# Patient Record
Sex: FEMALE | Race: WHITE | NOT HISPANIC OR LATINO | Employment: FULL TIME | URBAN - METROPOLITAN AREA
[De-identification: names, ages, dates, MRNs, and addresses within clinical notes are randomized per-mention and may not be internally consistent; named-entity substitution may affect disease eponyms.]

---

## 2019-08-15 ENCOUNTER — TELEPHONE (OUTPATIENT)
Dept: NEUROLOGY | Facility: CLINIC | Age: 29
End: 2019-08-15

## 2021-04-08 DIAGNOSIS — Z23 ENCOUNTER FOR IMMUNIZATION: ICD-10-CM

## 2021-04-21 ENCOUNTER — OFFICE VISIT (OUTPATIENT)
Dept: FAMILY MEDICINE CLINIC | Facility: CLINIC | Age: 31
End: 2021-04-21
Payer: COMMERCIAL

## 2021-04-21 VITALS
HEIGHT: 63 IN | SYSTOLIC BLOOD PRESSURE: 124 MMHG | HEART RATE: 74 BPM | TEMPERATURE: 98.9 F | DIASTOLIC BLOOD PRESSURE: 74 MMHG | BODY MASS INDEX: 42.97 KG/M2 | OXYGEN SATURATION: 97 % | WEIGHT: 242.5 LBS

## 2021-04-21 DIAGNOSIS — Z00.00 WELL ADULT EXAM: Primary | ICD-10-CM
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
## 2021-04-21 DIAGNOSIS — N91.2 AMENORRHEA: ICD-10-CM

## 2021-04-21 DIAGNOSIS — G40.409 OTHER GENERALIZED EPILEPSY, NOT INTRACTABLE, WITHOUT STATUS EPILEPTICUS (HCC): ICD-10-CM

## 2021-04-21 PROBLEM — G40.909 EPILEPSY (HCC): Status: ACTIVE | Noted: 2021-04-21

## 2021-04-21 PROBLEM — F31.81 BIPOLAR 2 DISORDER (HCC): Status: ACTIVE | Noted: 2021-04-21

## 2021-04-21 PROCEDURE — 3725F SCREEN DEPRESSION PERFORMED: CPT | Performed by: FAMILY MEDICINE

## 2021-04-21 PROCEDURE — 99385 PREV VISIT NEW AGE 18-39: CPT | Performed by: FAMILY MEDICINE

## 2021-04-21 PROCEDURE — 1036F TOBACCO NON-USER: CPT | Performed by: FAMILY MEDICINE

## 2021-04-21 PROCEDURE — 3008F BODY MASS INDEX DOCD: CPT | Performed by: FAMILY MEDICINE

## 2021-04-21 RX ORDER — LAMOTRIGINE 300 MG/1
TABLET, EXTENDED RELEASE ORAL
COMMUNITY
Start: 2012-02-27 | End: 2021-04-21

## 2021-04-21 RX ORDER — MULTIVITAMIN/IRON/FOLIC ACID 18MG-0.4MG
TABLET ORAL
COMMUNITY
Start: 2019-05-01

## 2021-04-21 RX ORDER — LAMOTRIGINE 300 MG/1
1 TABLET, EXTENDED RELEASE ORAL DAILY
COMMUNITY
Start: 2021-03-17 | End: 2022-03-04 | Stop reason: SDUPTHER

## 2021-04-21 NOTE — LETTER
April 21, 2021     Patient: Adrian Lundy   YOB: 1990   Date of Visit: 4/21/2021       To Whom it May Concern:    Adrian Lundy was seen in my clinic on 4/21/2021  She may return to work on 4/23/2021  If you have any questions or concerns, please don't hesitate to call           Sincerely,          Mike Valdivia DO        CC: No Recipients

## 2021-04-21 NOTE — PATIENT INSTRUCTIONS
NEUROLOGY EVALUATION FOR BREAK THROUGH SEIZURES   SCHEDULE WITH OB/GYN FOR PAP AND FOR LACK OF MENSTRUAL PERIODS  FASTING BLOOD WORK- [facility] OR Wednesday OR Thursday SCHEDULE HERE

## 2021-04-21 NOTE — PROGRESS NOTES
Zulema Mcneill is a 35 y o   female and is here for routine health maintenance  The patient reports no menses for many years, had hormones checked and were normal       History of Present Illness     Pt is here for her initial visit in our office  PT HAD SEIZURE YESTERDAY  Pt has epilepsy  She does not remember anything but her niece was with her at the time  She is not sure what she hit the right side of her face on  Her RIGHT EYE has BRUISING, LEFT CHEEK BONE AND UPPER LIP with swelling, LEFT FOOT pain, INTERMITTENT HEADACHE, NO DIZZINESS, NO NAUSEA  Family history MOM WITH EPILEPSY, DIAGNOSED AT 17YO  PSYCHIATRIST= DR Alex Turk- prescribes lamictal  Does not see a neurologist    Diane Juanjose, ARMS AND LEGS TIGHTEN AND SHAKE   Her seizures usually last 30 [address] prior to full seizure but she has no warnings  They try to LAY her DOWN IN SAFE PLACE  After the seizure, she generally SLEEPS ALL DAY  Pt states she has not had a menstrual cycle for many years  Had an evaluation and had hormones to induce her menstrual cycle and did not start cycle  Pt has been  for 1 year and is considering having children  Would recommend getting further evaluation with ob/gyn          Well Adult Physical   Patient here for a comprehensive physical exam       Diet and Physical Activity  Diet: well balanced diet  Weight concerns: Patient has class 3 obesity (BMI >40)  Exercise: frequently      Depression Screen  PHQ-9 Depression Screening    PHQ-9:   Frequency of the following problems over the past two weeks:      Little interest or pleasure in doing things: 0 - not at all  Feeling down, depressed, or hopeless: 0 - not at all  PHQ-2 Score: 0          General Health  Hearing: Normal:  bilateral  Vision: no vision problems  Dental: regular dental visits     History:  LMP: No LMP recorded        Cancer Screening  Colononoscopy not indicated  Mammogram not indicated  Pap needs to schedule  Abnormal pap? no  Smoker NO Annual screening with low-dose helical computed tomography (CT) for patients age 54 to 76 years with history of smoking at least 30 pack-years and, if a former smoker, had quit within the previous 15 years      The following portions of the patient's history were reviewed and updated as appropriate: allergies, current medications, past family history, past medical history, past social history, past surgical history and problem list     Review of Systems     Review of Systems   Constitutional: Negative  Negative for fatigue and fever  HENT: Negative  Eyes: Negative  Respiratory: Negative  Negative for cough  Cardiovascular: Negative  Gastrointestinal: Negative  Endocrine: Negative  Genitourinary: Positive for menstrual problem  Negative for pelvic pain  Musculoskeletal: Negative  Skin: Negative  Allergic/Immunologic: Negative  Neurological:        Seizures   Psychiatric/Behavioral: Negative  Past Medical History     History reviewed  No pertinent past medical history  Past Surgical History     History reviewed  No pertinent surgical history      Social History     Social History     Socioeconomic History    Marital status: /Civil Union     Spouse name: None    Number of children: None    Years of education: None    Highest education level: None   Occupational History    None   Social Needs    Financial resource strain: None    Food insecurity     Worry: None     Inability: None    Transportation needs     Medical: None     Non-medical: None   Tobacco Use    Smoking status: Never Smoker    Smokeless tobacco: Never Used   Substance and Sexual Activity    Alcohol use: Never     Frequency: Never    Drug use: Never    Sexual activity: None   Lifestyle    Physical activity     Days per week: None     Minutes per session: None    Stress: None   Relationships    Social connections     Talks on phone: None     Gets together: None     Attends Mosque service: None     Active member of club or organization: None     Attends meetings of clubs or organizations: None     Relationship status: None    Intimate partner violence     Fear of current or ex partner: None     Emotionally abused: None     Physically abused: None     Forced sexual activity: None   Other Topics Concern    None   Social History Narrative    None       Family History     History reviewed  No pertinent family history  Current Medications       Current Outpatient Medications:     lamoTRIgine  MG TB24, Take 1 tablet by mouth daily, Disp: , Rfl:     Multiple Vitamins-Minerals (Spectravite) TABS, , Disp: , Rfl:      Allergies     Allergies   Allergen Reactions    Ibuprofen & Acetaminophen Dizziness, Headache and Vomiting    Monosodium Glutamate - Food Allergy Dizziness, Headache and Vomiting    Penicillins Dizziness, Headache and Vomiting       Objective     /74   Pulse 74   Temp 98 9 °F (37 2 °C)   Ht 5' 2 5" (1 588 m)   Wt 110 kg (242 lb 8 oz)   SpO2 97%   BMI 43 65 kg/m²      Physical Exam  Vitals signs and nursing note reviewed  Constitutional:       Appearance: She is well-developed  HENT:      Head: Normocephalic and atraumatic  Cardiovascular:      Rate and Rhythm: Normal rate and regular rhythm  Heart sounds: Normal heart sounds  Pulmonary:      Effort: Pulmonary effort is normal       Breath sounds: Normal breath sounds  Abdominal:      General: Bowel sounds are normal       Palpations: Abdomen is soft  Skin:     General: Skin is warm and dry  Findings: Bruising present  Comments: ecchymosis over right eye  No obvious deformity   Neurological:      Mental Status: She is alert and oriented to person, place, and time  Psychiatric:         Behavior: Behavior normal          Thought Content: Thought content normal          Judgment: Judgment normal          BMI Counseling:  Body mass index is 43 65 kg/m²  The BMI is above normal  Nutrition recommendations include reducing portion sizes  Exercise recommendations include exercising 3-5 times per week  No exam data present    Health Maintenance     Health Maintenance   Topic Date Due    HIV Screening  Never done    COVID-19 Vaccine (1) Never done    BMI: Followup Plan  Never done    Annual Physical  Never done    Cervical Cancer Screening  Never done    BMI: Adult  04/21/2022    DTaP,Tdap,and Td Vaccines (7 - Td) 08/01/2024    HIB Vaccine  Completed    Hepatitis B Vaccine  Completed    Influenza Vaccine  Completed    HPV Vaccine  Completed    Pneumococcal Vaccine: Pediatrics (0 to 5 Years) and At-Risk Patients (6 to 59 Years)  Aged Out    IPV Vaccine  Aged Out    Hepatitis A Vaccine  Aged Out    Meningococcal ACWY Vaccine  Aged [employer] History   Administered Date(s) Administered    DTaP 04/21/1992, 07/28/1993, 03/07/1995, 05/16/1995    HPV Quadrivalent 05/07/2009, 06/11/2010, 09/20/2011    Hep B, Adolescent or Pediatric 05/12/1999, 06/16/1999, 11/26/1999    HiB 04/21/1992, 03/07/1995, 05/16/1995    INFLUENZA 08/22/2016, 02/26/2021    IPV 04/21/1992, 03/07/1995, 05/16/1995    MMR 03/07/1995, 04/12/2000    Td (adult), adsorbed 05/17/2005    Tdap 05/07/2009, 08/01/2014    Varicella 06/16/1999, 05/07/2009       Assessment/Plan         1  Healthy female exam   2  Patient Counseling:   · Nutrition: Stressed importance of a well balanced diet, moderation of sodium/saturated fat, caloric balance and sufficient intake of fiber  · Exercise: Stressed the importance of regular exercise with a goal of 150 minutes per week  · Dental Health: Discussed daily flossing and brushing and regular dental visits     · Immunizations reviewed  · Discussed benefits of screening   · Discussed the patient's BMI with her  The BMI is above average; BMI management plan is completed  3  Cancer Screening   4  Labs   5  Neurology evaluation  6  Follow up in one year      Elba Preston DO

## 2021-04-25 PROBLEM — N91.2 AMENORRHEA: Status: ACTIVE | Noted: 2021-04-25

## 2021-04-26 DIAGNOSIS — G40.909 NONINTRACTABLE EPILEPSY WITHOUT STATUS EPILEPTICUS, UNSPECIFIED EPILEPSY TYPE (HCC): Primary | ICD-10-CM

## 2021-04-26 PROBLEM — Z00.00 WELL ADULT EXAM: Status: ACTIVE | Noted: 2021-04-26

## 2021-04-27 ENCOUNTER — TELEPHONE (OUTPATIENT)
Dept: NEUROLOGY | Facility: CLINIC | Age: 31
End: 2021-04-27

## 2021-04-27 NOTE — TELEPHONE ENCOUNTER
Best contact number for patient: [phone]     Emergency Contact name and number: [phone] Bar [place]     Referring provider and telephone number: Bea Martinez     Primary Care Provider Name and if affiliated with [facility]:  Bea Single     Reason for Appointment/Dx: epilepsy     Have you seen and followed up with a pediatric Neurologist for this disease in the past?  No     No (If yes ok to schedule with Dr Nick Rolle)    Neurology Location patient would like to be seen:    Order received? Yes                                                 Records Received? Yes    Have you ever seen another Neurologist?       No    Insurance Information    Insurance Name:    ID/Policy #:    Secondary Insurance:    ID/Policy#: Workman's Comp/ Accident/ School  Information      Workman's Comp/Accident/School related?        No    If yes name of Insurance company:    Claim #:    Date of Injury:    Type of Injury:     Name and Telephone Number:    Notes:                   Appointment date: 07/15/2021

## 2021-08-18 ENCOUNTER — OFFICE VISIT (OUTPATIENT)
Dept: FAMILY MEDICINE CLINIC | Facility: CLINIC | Age: 31
End: 2021-08-18
Payer: COMMERCIAL

## 2021-08-18 VITALS
TEMPERATURE: 97.9 F | WEIGHT: 246.75 LBS | HEART RATE: 85 BPM | BODY MASS INDEX: 43.72 KG/M2 | DIASTOLIC BLOOD PRESSURE: 80 MMHG | OXYGEN SATURATION: 98 % | HEIGHT: 63 IN | SYSTOLIC BLOOD PRESSURE: 118 MMHG

## 2021-08-18 DIAGNOSIS — Z13.220 SCREENING FOR LIPID DISORDERS: ICD-10-CM

## 2021-08-18 DIAGNOSIS — M62.81 HAND MUSCLE WEAKNESS: ICD-10-CM

## 2021-08-18 DIAGNOSIS — J45.20 MILD INTERMITTENT ASTHMA WITHOUT COMPLICATION: Primary | ICD-10-CM

## 2021-08-18 DIAGNOSIS — Z13.1 SCREENING FOR DIABETES MELLITUS: ICD-10-CM

## 2021-08-18 DIAGNOSIS — Z13.29 SCREENING FOR THYROID DISORDER: ICD-10-CM

## 2021-08-18 DIAGNOSIS — Z13.0 SCREENING, ANEMIA, DEFICIENCY, IRON: ICD-10-CM
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
## 2021-08-18 PROBLEM — Z00.00 WELL ADULT EXAM: Status: RESOLVED | Noted: 2021-04-26 | Resolved: 2021-08-18

## 2021-08-18 PROCEDURE — 99213 OFFICE O/P EST LOW 20 MIN: CPT | Performed by: FAMILY MEDICINE

## 2021-08-18 PROCEDURE — 3008F BODY MASS INDEX DOCD: CPT | Performed by: FAMILY MEDICINE

## 2021-08-18 PROCEDURE — 1036F TOBACCO NON-USER: CPT | Performed by: FAMILY MEDICINE

## 2021-08-18 RX ORDER — ALBUTEROL SULFATE 90 UG/1
2 AEROSOL, METERED RESPIRATORY (INHALATION) EVERY 6 HOURS PRN
Qty: 18 G | Refills: 1 | Status: SHIPPED | OUTPATIENT
Start: 2021-08-18 | End: 2021-09-17

## 2021-08-18 NOTE — PROGRESS NOTES
Subjective:   Chief Complaint   Patient presents with    Asthma     pt c/o trouble breathing, chest tightness last weekend, pt states she need a refill on her inhailer, pt advised to schedule for pap    Hand Pain     pt c/o dull pain in her left hand when she applies pressure that started 2 months ago, pt states she noticed about 2 months ago that she could not bend her fingers on her left hand then shortly after that she noticed the pain in the hand        Patient ID: Lauren Renteria is a 28 y o  female  The patient is here today because she has problem with her left 5th finger  This has been going on for almost a year   The finger does not "work"  She cannot bring it in to touch her 4th finger   she denies any known trauma  It seemed to get a little bit better over the winter but has gotten worse again this summer  She has not done anything for, seen anyone for it     Additionally, last week her asthma flare  Was very humid and hot   She was feeling wheezy with some shortness of breath   She did use the last of her inhaler  I did work but she now needs a refill              Shortness of Breath  This is a recurrent problem  The current episode started in the past 7 days  The problem occurs rarely  The problem has been waxing and waning  Pertinent negatives include no abdominal pain, chest pain, claudication, coryza, ear pain, fever, headaches, hemoptysis, leg pain, leg swelling, neck pain, orthopnea, PND, rash, rhinorrhea, sore throat, sputum production, swollen glands, syncope, vomiting or wheezing  The symptoms are aggravated by emotional upset, exercise and fumes  The following portions of the patient's history were reviewed and updated as appropriate: allergies, current medications, past family history, past medical history, past social history, past surgical history and problem list     Review of Systems   Constitutional: Negative for fever     HENT: Negative for ear pain, rhinorrhea and sore throat  Respiratory: Positive for shortness of breath  Negative for hemoptysis, sputum production and wheezing  Cardiovascular: Negative for chest pain, orthopnea, claudication, leg swelling, syncope and PND  Gastrointestinal: Negative for abdominal pain and vomiting  Musculoskeletal: Negative for neck pain  Skin: Negative for rash  Neurological: Negative for headaches  Objective:  Vitals:    08/18/21 1249   BP: 118/80   Pulse: 85   Temp: 97 9 °F (36 6 °C)   SpO2: 98%   Weight: 112 kg (246 lb 12 oz)   Height: 5' 2 5" (1 588 m)      Physical Exam  Constitutional:       General: She is not in acute distress  Appearance: Normal appearance  She is not ill-appearing  Pulmonary:      Effort: Pulmonary effort is normal       Breath sounds: Normal breath sounds  No wheezing  Musculoskeletal:      Comments: Left 5th finger with 2/5 strength, no obvious abnormality, no pain   Skin:     General: Skin is warm and dry  Findings: No erythema or rash  Neurological:      General: No focal deficit present  Mental Status: She is alert and oriented to person, place, and time  Cranial Nerves: No cranial nerve deficit  Gait: Gait normal    Psychiatric:         Mood and Affect: Mood normal          Behavior: Behavior normal          Thought Content: Thought content normal          Judgment: Judgment normal            Assessment/Plan:    No problem-specific Assessment & Plan notes found for this encounter  Diagnoses and all orders for this visit:    Mild intermittent asthma without complication  -     albuterol (PROVENTIL HFA,VENTOLIN HFA) 90 mcg/act inhaler; Inhale 2 puffs every 6 (six) hours as needed for wheezing    Hand muscle weakness  -     Ambulatory referral to Orthopedic Surgery; Future    Screening, anemia, deficiency, iron  -     CBC and differential; Future  -     CBC and differential    Screening for diabetes mellitus  -     Comprehensive metabolic panel;  Future  - Comprehensive metabolic panel    Screening for lipid disorders  -     Lipid Panel with Direct LDL reflex    Screening for thyroid disorder  -     TSH, 3rd generation with Free T4 reflex;  Future  -     TSH, 3rd generation with Free T4 reflex

## 2021-09-29 ENCOUNTER — TELEPHONE (OUTPATIENT)
Dept: OBGYN CLINIC | Facility: MEDICAL CENTER | Age: 31
End: 2021-09-29

## 2021-09-29 NOTE — TELEPHONE ENCOUNTER
I called patient to see if she wanted to schedule an appt since she put in an appt request through the [facility] website, but no answer  I did leave a VM to give us a call back to schedule appt if still interested

## 2021-10-05 NOTE — TELEPHONE ENCOUNTER
Left 2nd msg for PT offer ortho Appt post email request  Provider calls were also left in August 2021 offering to schedule

## 2021-10-12 ENCOUNTER — OFFICE VISIT (OUTPATIENT)
Dept: OBGYN CLINIC | Facility: CLINIC | Age: 31
End: 2021-10-12
Payer: COMMERCIAL

## 2021-10-12 ENCOUNTER — APPOINTMENT (OUTPATIENT)
Dept: RADIOLOGY | Facility: CLINIC | Age: 31
End: 2021-10-12
Payer: COMMERCIAL

## 2021-10-12 VITALS
HEIGHT: 63 IN | SYSTOLIC BLOOD PRESSURE: 122 MMHG | WEIGHT: 247 LBS | BODY MASS INDEX: 43.77 KG/M2 | DIASTOLIC BLOOD PRESSURE: 82 MMHG

## 2021-10-12 DIAGNOSIS — M62.81 HAND MUSCLE WEAKNESS: ICD-10-CM

## 2021-10-12 DIAGNOSIS — M79.642 HAND PAIN, LEFT: ICD-10-CM

## 2021-10-12 DIAGNOSIS — R29.898 WEAKNESS OF LEFT HAND: Primary | ICD-10-CM

## 2021-10-12 PROCEDURE — 3008F BODY MASS INDEX DOCD: CPT | Performed by: ORTHOPAEDIC SURGERY

## 2021-10-12 PROCEDURE — 73130 X-RAY EXAM OF HAND: CPT

## 2021-10-12 PROCEDURE — 1036F TOBACCO NON-USER: CPT | Performed by: ORTHOPAEDIC SURGERY

## 2021-10-12 PROCEDURE — 99244 OFF/OP CNSLTJ NEW/EST MOD 40: CPT | Performed by: ORTHOPAEDIC SURGERY

## 2021-12-08 ENCOUNTER — HOSPITAL ENCOUNTER (OUTPATIENT)
Dept: NEUROLOGY | Facility: CLINIC | Age: 31
Discharge: HOME | End: 2021-12-08
Payer: COMMERCIAL

## 2021-12-08 DIAGNOSIS — R29.898 WEAKNESS OF LEFT HAND: ICD-10-CM

## 2021-12-08 PROCEDURE — 95910 NRV CNDJ TEST 7-8 STUDIES: CPT

## 2021-12-08 PROCEDURE — 95910 NRV CNDJ TEST 7-8 STUDIES: CPT | Performed by: PSYCHIATRY & NEUROLOGY

## 2021-12-08 PROCEDURE — 95886 MUSC TEST DONE W/N TEST COMP: CPT | Performed by: PSYCHIATRY & NEUROLOGY

## 2021-12-08 PROCEDURE — 95886 MUSC TEST DONE W/N TEST COMP: CPT

## 2022-02-17 ENCOUNTER — HOSPITAL ENCOUNTER (EMERGENCY)
Facility: HOSPITAL | Age: 32
Discharge: HOME | End: 2022-02-17
Attending: EMERGENCY MEDICINE | Admitting: EMERGENCY MEDICINE
Payer: COMMERCIAL

## 2022-02-17 ENCOUNTER — APPOINTMENT (EMERGENCY)
Dept: RADIOLOGY | Facility: HOSPITAL | Age: 32
End: 2022-02-17
Payer: COMMERCIAL

## 2022-02-17 VITALS
SYSTOLIC BLOOD PRESSURE: 138 MMHG | DIASTOLIC BLOOD PRESSURE: 82 MMHG | TEMPERATURE: 97.8 F | BODY MASS INDEX: 45.45 KG/M2 | HEIGHT: 62 IN | WEIGHT: 247 LBS | HEART RATE: 86 BPM | RESPIRATION RATE: 20 BRPM | OXYGEN SATURATION: 99 %

## 2022-02-17 DIAGNOSIS — M54.50 LUMBAR BACK PAIN: ICD-10-CM

## 2022-02-17 DIAGNOSIS — G40.909 RECURRENT SEIZURES (HCC): Primary | ICD-10-CM

## 2022-02-17 PROCEDURE — 99285 EMERGENCY DEPT VISIT HI MDM: CPT | Performed by: EMERGENCY MEDICINE

## 2022-02-17 PROCEDURE — 99283 EMERGENCY DEPT VISIT LOW MDM: CPT

## 2022-02-17 PROCEDURE — 72100 X-RAY EXAM L-S SPINE 2/3 VWS: CPT

## 2022-02-17 RX ORDER — LIDOCAINE 50 MG/G
1 PATCH TOPICAL ONCE
Status: DISCONTINUED | OUTPATIENT
Start: 2022-02-17 | End: 2022-02-17 | Stop reason: HOSPADM

## 2022-02-17 RX ADMIN — LIDOCAINE 5% 1 PATCH: 700 PATCH TOPICAL at 18:56

## 2022-02-17 NOTE — DISCHARGE INSTRUCTIONS
Take over-the-counter Motrin (3 tablets with food) every 8 hours as needed for pain and inflammation  You should also purchase over-the-counter lidocaine pain patches  Ask the pharmacist for help purchasing an appropriate product

## 2022-02-17 NOTE — Clinical Note
Katey Wooten was seen and treated in our emergency department on 2/17/2022  No work until cleared by Family Doctor/Orthopedics        Diagnosis:     Yael Abdi    She may return on this date: If you have any questions or concerns, please don't hesitate to call        Hannah Youssef PA-C    ______________________________           _______________          _______________  Hospital Representative                              Date                                Time

## 2022-02-17 NOTE — ED PROVIDER NOTES
History  Chief Complaint   Patient presents with    Back Pain     Pts  states that pt fell in bathroom earlier and then when he assisted her to the bed she had a seizure for 90 seconds, he states pt was having tense arms, shaking movements and that she was drooling  Pt denies any missed doses  71-year-old female presents for evaluation of low back pain sustained after a fall during a seizure where she was witnessed to have struck her low back against the edge of the toilet  The patient has a history of epilepsy Lamictal   She is compliant with her medications however she was woken early this morning due to wild family members  The patient had a witnessed seizure without head strike this morning similar to previous seizures  The patient returned to her baseline however she is complaining of pain in the lumbar region  She denies any associated numbness or tingling in her lower extremities, genitals or rectum  The patient has normal control of bowel or bladder at this time  The patient denies headache focal weakness  The patient did not take anything for pain prior to arrival   Her pain is worse with movement  Back Pain      Prior to Admission Medications   Prescriptions Last Dose Informant Patient Reported? Taking? Multiple Vitamins-Minerals (Spectravite) TABS   Yes No   lamoTRIgine  MG TB24   Yes No   Sig: Take 1 tablet by mouth daily      Facility-Administered Medications: None       Past Medical History:   Diagnosis Date    Bipolar 2 disorder ([facility] )     Epilepsy ([facility] )        Past Surgical History:   Procedure Laterality Date    WISDOM TOOTH EXTRACTION         Family History   Problem Relation Age of Onset    Bipolar disorder Mother     Seizures Mother     Depression Mother      I have reviewed and agree with the history as documented      E-Cigarette/Vaping    E-Cigarette Use Never User      E-Cigarette/Vaping Substances     Social History     Tobacco Use    Smoking status: Never Smoker    Smokeless tobacco: Never Used   Vaping Use    Vaping Use: Never used   Substance Use Topics    Alcohol use: Never    Drug use: Never       Review of Systems   Musculoskeletal: Positive for back pain  Neurological: Positive for seizures  All other systems reviewed and are negative  Physical Exam  Physical Exam  Vitals and nursing note reviewed  Constitutional:       General: She is not in acute distress  Appearance: She is well-developed  She is obese  HENT:      Head: Normocephalic and atraumatic  Right Ear: External ear normal       Left Ear: External ear normal    Eyes:      General: No scleral icterus  Conjunctiva/sclera: Conjunctivae normal       Pupils: Pupils are equal, round, and reactive to light  Cardiovascular:      Rate and Rhythm: Normal rate and regular rhythm  Heart sounds: Normal heart sounds  Pulmonary:      Effort: Pulmonary effort is normal  No respiratory distress  Breath sounds: Normal breath sounds  Abdominal:      General: Bowel sounds are normal       Palpations: Abdomen is soft  Tenderness: There is no abdominal tenderness  There is no guarding or rebound  Musculoskeletal:         General: Normal range of motion  Cervical back: Normal range of motion  Lumbar back: Tenderness and bony tenderness present  No deformity  Skin:     General: Skin is warm and dry  Findings: No rash  Neurological:      Mental Status: She is alert and oriented to person, place, and time           Vital Signs  ED Triage Vitals [02/17/22 1350]   Temperature Pulse Respirations Blood Pressure SpO2   97 8 °F (36 6 °C) 88 16 147/78 99 %      Temp Source Heart Rate Source Patient Position - Orthostatic VS BP Location FiO2 (%)   Temporal Monitor Sitting Right arm --      Pain Score       10 - Worst Possible Pain           Vitals:    02/17/22 1350 02/17/22 1830   BP: 147/78 138/82   Pulse: 88 86   Patient Position - Orthostatic VS: Sitting Visual Acuity      ED Medications  Medications   lidocaine (LIDODERM) 5 % patch 1 patch (has no administration in time range)       Diagnostic Studies  Results Reviewed     Procedure Component Value Units Date/Time    POCT urinalysis dipstick [[id number]]     Lab Status: No result Specimen: Urine                  XR lumbar spine 2 or 3 views   ED Interpretation by Vangie Gu DO (02/17 1846)   No acute osseous abnormality  Loss of lumbar lordosis                 Procedures  Procedures         ED Course                                             MDM  Number of Diagnoses or Management Options  Lumbar back pain  Recurrent seizures ([facility] )  Diagnosis management comments: The patient returned to her baseline from her seizure  Patient states that she has seizures several times a year under most recent seizure was 3 weeks ago  Patient does not have a neurologist currently  The plan is to obtain x-rays of the back to rule out fracture/dislocation  Patient will also be given the number for neurologist for outpatient management  The patient (and any family present) verbalized understanding of the discharge instructions and warnings that would necessitate return to the Emergency Department  All questions were answered prior to discharge  Amount and/or Complexity of Data Reviewed  Independent visualization of images, tracings, or specimens: yes        Disposition  Final diagnoses:   Recurrent seizures ([facility] 75 )   Lumbar back pain     Time reflects when diagnosis was documented in both MDM as applicable and the Disposition within this note     Time User Action Codes Description Comment    2/17/2022  6:28 PM Dada Slice Add [J27 284] [facility] seizures ([facility] )     2/17/2022  6:28 PM Chelsea Graft [M54 50] Lumbar back pain       ED Disposition     ED Disposition Condition Date/Time Comment    Discharge Stable u Feb 17, 2022  6:48 PM Matracia Ployd discharge to home/self care              Follow-up Information     Follow up With Specialties Details Why Contact Info Additional Information    Bea Martinez DO Family Medicine Schedule an appointment as soon as possible for a visit  For further evaluation [address] [id number] [facility]rology Schedule an appointment as soon as possible for a visit  For further evaluation Asya Garces [address] [facility] [phone]  [address] [facility] Neurology [address], [address], [address], [place], [place], [address] [phone]          Patient's Medications   Discharge Prescriptions    No medications on file       No discharge procedures on file      PDMP Review     None          ED Provider  Electronically Signed by           Raúl Licea DO  02/17/22 2282

## 2022-02-18 ENCOUNTER — OFFICE VISIT (OUTPATIENT)
Dept: FAMILY MEDICINE CLINIC | Facility: CLINIC | Age: 32
End: 2022-02-18
Payer: COMMERCIAL

## 2022-02-18 VITALS
HEIGHT: 62 IN | OXYGEN SATURATION: 98 % | TEMPERATURE: 97.4 F | HEART RATE: 89 BPM | SYSTOLIC BLOOD PRESSURE: 130 MMHG | DIASTOLIC BLOOD PRESSURE: 90 MMHG | BODY MASS INDEX: 46.19 KG/M2 | WEIGHT: 251 LBS

## 2022-02-18 DIAGNOSIS — S39.92XD BUTTOCK TRAUMA, SUBSEQUENT ENCOUNTER: ICD-10-CM
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
## 2022-02-18 DIAGNOSIS — G40.909 NONINTRACTABLE EPILEPSY WITHOUT STATUS EPILEPTICUS, UNSPECIFIED EPILEPSY TYPE (HCC): ICD-10-CM

## 2022-02-18 PROCEDURE — 99214 OFFICE O/P EST MOD 30 MIN: CPT | Performed by: FAMILY MEDICINE

## 2022-02-18 PROCEDURE — 3725F SCREEN DEPRESSION PERFORMED: CPT | Performed by: FAMILY MEDICINE

## 2022-02-18 PROCEDURE — 1036F TOBACCO NON-USER: CPT | Performed by: FAMILY MEDICINE
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
## 2022-02-18 NOTE — RESULT ENCOUNTER NOTE
Called patient and informed her of xray results and to f/u with PCP for CT thoracic back to further evaluate compression deformity since she continues with pain

## 2022-02-19 PROBLEM — S39.92XD: Status: ACTIVE | Noted: 2022-02-19
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
## 2022-03-04 DIAGNOSIS — G40.909 NONINTRACTABLE EPILEPSY WITHOUT STATUS EPILEPTICUS, UNSPECIFIED EPILEPSY TYPE (HCC): ICD-10-CM

## 2022-03-04 DIAGNOSIS — F31.81 BIPOLAR 2 DISORDER (HCC): Primary | ICD-10-CM

## 2022-03-04 RX ORDER — LAMOTRIGINE 300 MG/1
1 TABLET, EXTENDED RELEASE ORAL DAILY
Qty: 90 TABLET | Refills: 0 | Status: SHIPPED | OUTPATIENT
Start: 2022-03-04 | End: 2022-04-11 | Stop reason: RX

## 2022-04-11 ENCOUNTER — TELEPHONE (OUTPATIENT)
Dept: FAMILY MEDICINE CLINIC | Facility: CLINIC | Age: 32
End: 2022-04-11

## 2022-04-11 DIAGNOSIS — F31.81 BIPOLAR 2 DISORDER (HCC): Primary | ICD-10-CM

## 2022-04-11 RX ORDER — LAMOTRIGINE 100 MG/1
300 TABLET, EXTENDED RELEASE ORAL DAILY
Qty: 90 TABLET | Refills: 1 | Status: SHIPPED | OUTPATIENT
Start: 2022-04-11 | End: 2022-05-04

## 2022-04-11 NOTE — TELEPHONE ENCOUNTER
[facility] call and said they are on a certain medication generic (LAMOTRIGINE ER 300mg and they can not get them in stock can they are adking if you can change the medication to 100mg for her to take 3

## 2022-04-13 ENCOUNTER — TELEPHONE (OUTPATIENT)
Dept: NEUROLOGY | Facility: CLINIC | Age: 32
End: 2022-04-13

## 2022-04-13 NOTE — TELEPHONE ENCOUNTER
Called Leslye juares for her to call back to schedule follow up with Dr Winsome Posey in [facility] or [place]   Left call back number [phone]      FU from LOV:7-15-21 for Seizures

## 2022-05-03 DIAGNOSIS — F31.81 BIPOLAR 2 DISORDER (HCC): ICD-10-CM

## 2022-05-04 RX ORDER — LAMOTRIGINE 100 MG/1
300 TABLET, EXTENDED RELEASE ORAL DAILY
Qty: 90 TABLET | Refills: 1 | Status: SHIPPED | OUTPATIENT
Start: 2022-05-04 | End: 2022-05-29

## 2022-09-27 ENCOUNTER — TELEPHONE (OUTPATIENT)
Dept: NEUROLOGY | Facility: CLINIC | Age: 32
End: 2022-09-27

## 2022-10-06 ENCOUNTER — TELEPHONE (OUTPATIENT)
Dept: FAMILY MEDICINE CLINIC | Facility: CLINIC | Age: 32
End: 2022-10-06

## 2022-10-06 DIAGNOSIS — G40.909 NONINTRACTABLE EPILEPSY WITHOUT STATUS EPILEPTICUS, UNSPECIFIED EPILEPSY TYPE (HCC): Primary | ICD-10-CM

## 2022-10-06 NOTE — TELEPHONE ENCOUNTER
[facility] ([facility] Neurology phoned - they need orders placed for [facility] for a [facility] First Referral     She has an appointment on 10/13/22

## 2022-10-11 ENCOUNTER — TELEPHONE (OUTPATIENT)
Dept: FAMILY MEDICINE CLINIC | Facility: CLINIC | Age: 32
End: 2022-10-11

## 2022-10-11 DIAGNOSIS — K21.9 GASTROESOPHAGEAL REFLUX DISEASE WITHOUT ESOPHAGITIS: Primary | ICD-10-CM

## 2022-10-11 RX ORDER — FAMOTIDINE 20 MG/1
20 TABLET, FILM COATED ORAL DAILY
Qty: 30 TABLET | Refills: 1 | Status: SHIPPED | OUTPATIENT
Start: 2022-10-11

## 2022-10-11 NOTE — TELEPHONE ENCOUNTER
----- Message from [facility] sent at 10/11/2022  9:29 AM EDT -----  Regarding: Acid reflux  I've been having acid reflux like my  at night  I took zantac (famotidine) which worked   I was just wondering if I could et prescribed because buying OTC it's pretty expensive

## 2022-10-12 ENCOUNTER — TELEPHONE (OUTPATIENT)
Dept: NEUROLOGY | Facility: CLINIC | Age: 32
End: 2022-10-12

## 2022-10-12 NOTE — TELEPHONE ENCOUNTER
Left detailed message for patient regarding appt on 10/13 with Dr Alis Jones stating that the provider will be out of the office and the appt has been rescheduled for 11/17 at 2pm instead if the patient has any questions or can not make this appt I left my direct line  Will make 2nd attempt tomorrow morning If patient does not call back in

## 2022-11-08 DIAGNOSIS — K21.9 GASTROESOPHAGEAL REFLUX DISEASE WITHOUT ESOPHAGITIS: ICD-10-CM

## 2022-11-08 RX ORDER — FAMOTIDINE 20 MG/1
TABLET, FILM COATED ORAL
Qty: 90 TABLET | Refills: 1 | Status: SHIPPED | OUTPATIENT
Start: 2022-11-08

## 2022-11-09 ENCOUNTER — TELEPHONE (OUTPATIENT)
Dept: NEUROLOGY | Facility: CLINIC | Age: 32
End: 2022-11-09

## 2022-11-09 NOTE — TELEPHONE ENCOUNTER
I called and left a voicemail message about patients upcoming appointment with Dr Parham on 11/17/22 @ 2 pm  I requested a call back to our office to confirm the appointment or if the patient has any issues or concerns or cannot keep this appointment

## 2022-11-17 ENCOUNTER — OFFICE VISIT (OUTPATIENT)
Dept: NEUROLOGY | Facility: CLINIC | Age: 32
End: 2022-11-17

## 2022-11-17 VITALS
DIASTOLIC BLOOD PRESSURE: 86 MMHG | SYSTOLIC BLOOD PRESSURE: 120 MMHG | WEIGHT: 271.6 LBS | RESPIRATION RATE: 18 BRPM | HEART RATE: 116 BPM | HEIGHT: 62 IN | OXYGEN SATURATION: 98 % | BODY MASS INDEX: 49.98 KG/M2 | TEMPERATURE: 98.4 F

## 2022-11-17 DIAGNOSIS — G40.909 NONINTRACTABLE EPILEPSY WITHOUT STATUS EPILEPTICUS, UNSPECIFIED EPILEPSY TYPE (HCC): ICD-10-CM

## 2022-11-17 DIAGNOSIS — F31.81 BIPOLAR 2 DISORDER (HCC): ICD-10-CM
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
## 2022-11-17 PROBLEM — R29.898 WEAKNESS OF LEFT HAND: Status: RESOLVED | Noted: 2021-10-12 | Resolved: 2022-11-17

## 2022-11-17 PROBLEM — S39.92XD: Status: RESOLVED | Noted: 2022-02-19 | Resolved: 2022-11-17

## 2022-11-17 RX ORDER — LAMOTRIGINE 200 MG/1
400 TABLET, EXTENDED RELEASE ORAL DAILY
Qty: 60 TABLET | Refills: 5 | Status: SHIPPED | OUTPATIENT
Start: 2022-11-17 | End: 2022-11-21 | Stop reason: SDUPTHER

## 2022-11-17 RX ORDER — FOLIC ACID 1 MG/1
1 TABLET ORAL DAILY
Qty: 90 TABLET | Refills: 3 | Status: SHIPPED | OUTPATIENT
Start: 2022-11-17

## 2022-11-17 NOTE — PROGRESS NOTES
[address] [facility]  Patient's Name: Kendall Muse   Patient's : 1990   Visit Type: new patient  Referring MD / PCP:  Yissel Medrano DO    Assessment:  Ms Kendall Muse is a 33 y o  with epilepsy, clinical semiology suggests that she has myoclonic jerks and presumably generalized tonic clonic seizures  I have no prior EEG study or MRI brain imaging study to determine the type of epilepsy she has  She will need an MRI brain study to assess for a structural cause to there seizures and EEG study to determine if there are focal or generalized epileptiform discharges  I reviewed the diagnosis of epilepsy, as the predisposition to recurrent unprovoked seizures due to abnormal synchronous activity of brain  The diagnosis is based on the presumed risk of recurrent unprovoked seizures  Which can be made either with a history of 2 unprovoked seizures or clinical event(s) that is consistent with an epileptic seizure, an abnormal EEG with epileptiform discharges, or a structural lesion in the brain  She qualifies for a working diagnosis of epilepsy due to at least two unprovoked seizures  I explained that we do not always know the cause of epilepsy but that other than seizures there are other symptoms that are co-morbid including cognitive impairments, such as memory and language deficits, psychiatric disorders, and medication side effects that may require periodic monitoring  I reviewed that patients with epilepsy frequently have a co-existent mood or anxiety disorder that can worsen with medications or are an independent medical condition  It is not uncommon that patients with epilepsy develop lower self-esteem, anxiety about seizures, difficulty with independence, and suicidal ideation  We frequently do refer patients for behavioral health assessment with a psychiatrist or clinical psychologist for psychotherapy, mindfulness, and medications    About 60-70% of patients with epilepsy can be controlled with the first 1-3 medications or a combination of medications  About a third of patients could be difficult to control with medications and further investigation into the diagnosis, with an inpatient epilepsy monitoring study to confirm the diagnosis of epilepsy or nonepileptic events (that can be cardiac, other neurological conditions, or psychogenic in origin)  I discussed seizure safety and seizure first aid with the patient  Limits would be no unprotected heights (ladders, standing on chairs/tables), should not swim alone (need partners or ), cooking with a partner to avoid burn injuries, showers instead of baths  I reviewed that convulsive seizures typically last about 2 minutes with about 15-30 minutes of recovery  Should a seizure last more than 5 minutes or patient fails to recover after 30 minutes or there are multiple seizures in a day or if there is a suspected head injury then EMS should be called or they go to the nearest emergency room  If she only experiences altered awareness, confusion, or focal seizures, then they may call the office for guidance  Seizure first aid consists of preventing the patient from wandering or removal of dangerous objects or prevention of injury, for convulsive seizures, position the patient on the ground, may place a pillow under the head, turn the patient to her side, no objects or reaching for the mouth, no restraints but prevent injuries by removing glasses or sharp/heavy objects, and time the duration of the seizure  I recommend that she obtain a medical alert bracelet that states "Seizure disorder" or "Epilepsy" along with any medication allergies  She reports that she does not drive and does not have a 's license  Plan:   Epilepsy  Discussed about taking a higher dose of lamotrigine versus adding back on topiramate  Teratogenic risk of topiramate and lamotrigine were discussed, more so with topiramate    Recommend folic acid supplementation while on antiseizure medications  She opted to try a higher dose of lamotrigine before going back to topiramate  She reports that when she was on topiramate it was effective in suppressing her seizures  However, lamotrigine is needed for her bipolar disorder   - increase lamotrigine ER to 400mg daily (use four 100mg tabs daily, next refill is for 200mg tabs take two tabs daily  - Sleep deprived EEG  - MRI brain w/wo contrast  - in about 2-4 week (or just prior to MRI study) check CBC, CMP, lamotrigine level, and vitamin D level  - take folic acid 1 mg daily    Follow-up in 3 months with advanced practitioner    Problem List Items Addressed This Visit        Nervous and Auditory    Epilepsy ([facility] )    Relevant Medications    lamoTRIgine  MG SL91    folic acid ( Folic Acid) 1 mg tablet    Other Relevant Orders    MRI brain seizure wo and w contrast    EEG Sleep deprived    Lamotrigine level    Comprehensive metabolic panel    CBC    Vitamin D 25 hydroxy       Other    Bipolar 2 disorder ([facility] )         Chief Complaint:   Chief Complaint   Patient presents with   • Seizures      HPI:      Yoel Velazco is a 33 y o  right handed female here for new patient evaluation of seizures  Intake History 11/17/2022  She tells me that she was diagnosed at [address] when she was 13years old, last seen a neurologist when she was 23years old  With her first seizure, she was at home in the kitchen, with a , she had an ice tea glass in her right hand, she started to lean backwards in the chair, she passed out  She does not remember what happened next  She wake up in the ambulance, passed out, then woke up in the hospital   She was put on Keppra, she gained weight, depression, and tired  Her medication was switched to Tegretol, but she does not know what happened  But she was then tried on Topamax  She recalled that Topamax worked    At some point, Lamictal was added for her bipolar disorder [facility])  She may have been on Topamax from [age] years old  There were no seizures  Then she was taken off of Topamax because she was on Lamictal  She was without seizures for nearly 10 years  She started to have breakthrough seizures when she was 23years old  She was only on Lamictal   Since 23years old she has not gone a full year without seizures  Sometimes she may get a feeling that a seizure would start  She gets twitchings in the arms, lasting about 10 minutes before she goes into a seizure  She may have twitching through out the day  Sometimes lack of sleep or flashing light or extreme fatigue can trigger twitching, which sometimes evolve to a seizure  She may go a month or two without seizures, or sometimes she has a seizure 2-3 times a month  Her last seizure was 10 days ago  She recalled that she was awake, in bed, then woke up again noticing that she had bitten her tongue  Sometimes she bites her tongue from a seizure (random spots)  She denies urinary incontinence  She has had multiple EEGs and 24 hours ambulatory EEG studies and was told that she had epilepsy  Her boyfriend says that when she has a seizure she is shaking and twitching, she sounds like she is gurgling sound  Seizure may start with body twitching, then her fists are clenched, arms are stiff and contracted  Seizures last 2-3 minutes, then her muscles are very tense during the seizure, she relaxes, then she wants to go to sleep  She is unable to talk in the post-ictal phase  Short seizures may have immediate recovery, but with longer seizures she may require 10-15 minutes to recover  She is currently on lamotrigine 300mg daily      The patient denies any history of myoclonus, staring spells, automatisms, unexplained hyperkinetic behaviors, olfactory / gustatory hallucinations, epigastric rising events, yusra vu events, visual hallucinations, unexplained nocturnal enuresis, or nocturnal tongue biting  AED/side effects/compliance:  Lamotrigine 300mg daily    Event/Seizure semiology:  1  Generalized tonic clonic seizure  2   Myoclonic jerking    Woman of childbearing age with Epilepsy:  Contraception: No; she says she has not had a period since she was 24years old  Folic acid supplement:  No    Prior Epilepsy History:  x    Special Features  Status epilepticus: No  Self Injury Seizures: No  Precipitating Factors: None    Epilepsy Risk Factors:  Abnormal pregnancy: No  Abnormal birth/: No  Abnormal Development: No  Febrile seizures, simple: No  Febrile seizures, complex: No  CNS infection: No  Intellectual disability: No  Cerebral palsy: No  Head injury (moderate/severe): No  CNS neoplasm: No  CNS malformation: No  Neurosurgical procedure: No  Stroke: No  CNS autoimmune disorder: No  Alcohol abuse: No  Drug abuse: No  Family history Sz/epilepsy: mother, maternal grandmother, maternal uncle    Prior AEDs:  medication Max dose Time used Reason to stop   topiramate      levetiracetam      carbamazepine      lamotrigine              Prior workup:  x  Imaging:  None availabe    EEGs:  None available    Labs:  None available    General exam   /86 (BP Location: Left arm, Patient Position: Sitting, Cuff Size: Large)   Pulse (!) 116   Temp 98 4 °F (36 9 °C) (Tympanic)   Resp 18   Ht 5' 2" (1 575 m)   Wt 123 kg (271 lb 9 6 oz)   SpO2 98%   BMI 49 68 kg/m²    Appearance: normally developed, appears well  Carotids: no bruits present  Cardiovascular: regular rate and rhythm and normal heart sounds  Pulmonary: clear to auscultation  Abdominal: soft, obese, nontender  Extremities: no edema    HEENT: anicteric and moist mucus membranes / oral cavity   Fundoscopy: bilateral optic discs are sharp    Mental status  Orientation: alert and oriented to name, place, time  Fund of Knowledge: intact   Attention and Concentration: able to spell HOUSE forwards and backwards  Current and Remote Memory:recalled 3/3 words after five minutes  Language: spontaneous speech is normal and comprehension is intact    Cranial Nerves  CN 1: not tested  CN 2: Visual fields intact to confrontation and pupils equal round reactive to direct and consenual light   CN 3, 4, 6: EOMI, no nystagmus  CN 5:sensation intact to all distribution V1, V2, V3  CN 7:muscles of facial expression are symmetric  CN 8:symmetric to finger rubs bilaterally  CN 9, 10:no dysarthria present  CN 11:symmetric strength of sternocleidomastoid and trapezius muscles  CN 12:tongue is midline    Motor:  Bulk, Tone: normal bulk, normal tone  Pronation: no pronator drift  Strength: Patient has full strength symmetrically of shoulder abduction, biceps, triceps, finger flexion, finger abduction, hip flexion, knee flexion, knee extension, dorsiflexion  Abnormal movements: no abnormal movements are present    Sensory:  Lighttouch: intact in all limbs  Romberg:normal    Coordination:  FNF:FNF bilaterally intact  COREY:intact  FFM:intact  Gait/Station:normal gait and normal tandem gait    Reflexes:  Reflexes were normal and symmetric, tested bilateral biceps, triceps, brachiradialis, patellar, ankles    Past Medical/Surgical History:  Patient Active Problem List   Diagnosis   • Bipolar 2 disorder ([facility] )   • Epilepsy ([facility] )   • TMJ (dislocation of temporomandibular joint)   • BMI 45 0-49 9, adult ([facility] )   • Amenorrhea     Past Medical History:   Diagnosis Date   • Bipolar 2 disorder ([facility] )    • Epilepsy ([facility] )      Past Surgical History:   Procedure Laterality Date   • WISDOM TOOTH EXTRACTION         Past Psychiatric History:  Depression: Bipolar  Anxiety: No  Psychosis: No  Psychiatrist - needs a new one; she was at [facility]  Denies history of behavioral health unit    Medications:    Current Outpatient Medications:   •  famotidine (PEPCID) 20 mg tablet, TAKE 1 TABLET BY MOUTH EVERY DAY, Disp: 90 tablet, Rfl: 1  •  folic acid ( Folic Acid) 1 mg tablet, Take 1 tablet (1 mg total) by mouth daily, Disp: 90 tablet, Rfl: 3  •  lamoTRIgine  MG TB24, Take 2 tablets (400 mg total) by mouth daily, Disp: 60 tablet, Rfl: 5  •  Multiple Vitamins-Minerals (Spectravite) TABS, , Disp: , Rfl:     Allergies: Allergies   Allergen Reactions   • Ibuprofen & Acetaminophen Dizziness, Headache and Vomiting   • Monosodium Glutamate - Food Allergy Dizziness, Headache and Vomiting   • Penicillins Dizziness, Headache and Vomiting       Family history:  Family History   Problem Relation Age of Onset   • Bipolar disorder Mother    • Seizures Mother    • Depression Mother      Social History  Living situation:  Lives with  and in-laws  Work:  Completed high school, not working, 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)  Driving:  No   reports that she has never smoked  She has never used smokeless tobacco  She reports that she does not drink alcohol and does not use drugs  Review of Systems  A review of at least 12 organ/systems was obtained by the medical assistant and reviewed by me, including additional positives/negatives:  Neurological: Positive for seizures (last monday- seizure Tuesday body twitching- possible seizure )  Negative for dizziness, tremors, syncope, facial asymmetry, speech difficulty, weakness, light-headedness, numbness and headaches  Decision making was of high-complexity due to the patient's high risk condition (seizures), psychiatric and neuropsychological comorbidities, behavioral problems, memory and cognitive problems and medication side effects  The total amount of time spent with the patient along with pre-chart and post-chart preparation was 65 minutes on the calendar day of the date of service    This included history taking, physical exam, review of ancillary testing, counseling provided to the patient regarding diagnosis, medications, treatment, and risk management, and other communication to the patient's providers and/or family    Start time: 1:55PM  End time: 3PM

## 2022-11-17 NOTE — PROGRESS NOTES
Review of Systems   Constitutional: Negative  Negative for appetite change and fever  HENT: Negative  Negative for hearing loss, tinnitus, trouble swallowing and voice change  Eyes: Negative  Negative for photophobia, pain and visual disturbance  Respiratory: Negative  Negative for shortness of breath  Cardiovascular: Negative  Negative for palpitations  Gastrointestinal: Negative  Negative for nausea and vomiting  Endocrine: Negative  Negative for cold intolerance  Genitourinary: Negative  Negative for dysuria, frequency and urgency  Musculoskeletal: Negative  Negative for gait problem, myalgias and neck pain  Skin: Negative  Negative for rash  Allergic/Immunologic: Negative  Neurological: Positive for seizures (last monday- seizure Tuesday body twitching- possible seizure )  Negative for dizziness, tremors, syncope, facial asymmetry, speech difficulty, weakness, light-headedness, numbness and headaches  Hematological: Negative  Does not bruise/bleed easily  Psychiatric/Behavioral: Negative  Negative for confusion, hallucinations and sleep disturbance

## 2022-11-17 NOTE — PATIENT INSTRUCTIONS
Epilepsy  Discussed about taking a higher dose of lamotrigine versus adding back on topiramate  Teratogenic risk of topiramate and lamotrigine were discussed, more so with topiramate  Recommend folic acid supplementation while on antiseizure medications  - increase lamotrigine ER to 400mg daily (use four 100mg tabs daily, next refill is for 200mg tabs take two tabs daily  - Sleep deprived EEG  - MRI brain w/wo contrast  - in about 2-4 week (or just prior to MRI study) check CBC, CMP, lamotrigine level, and vitamin D level  - take folic acid 1 mg daily    Follow-up in 3 months with advanced practitioner    I reviewed the diagnosis of epilepsy, as the predisposition to recurrent unprovoked seizures due to abnormal synchronous activity of brain  The diagnosis is based on the presumed risk of recurrent unprovoked seizures  Which can be made either with a history of 2 unprovoked seizures or clinical event(s) that is consistent with an epileptic seizure, an abnormal EEG with epileptiform discharges, or a structural lesion in the brain  She qualifies for a working diagnosis of epilepsy due to at least two unprovoked seizures  I explained that we do not always know the cause of epilepsy but that other than seizures there are other symptoms that are co-morbid including cognitive impairments, such as memory and language deficits, psychiatric disorders, and medication side effects that may require periodic monitoring  I reviewed that patients with epilepsy frequently have a co-existent mood or anxiety disorder that can worsen with medications or are an independent medical condition  It is not uncommon that patients with epilepsy develop lower self-esteem, anxiety about seizures, difficulty with independence, and suicidal ideation  We frequently do refer patients for behavioral health assessment with a psychiatrist or clinical psychologist for psychotherapy, mindfulness, and medications    About 60-70% of patients with epilepsy can be controlled with the first 1-3 medications or a combination of medications  About a third of patients could be difficult to control with medications and further investigation into the diagnosis, with an inpatient epilepsy monitoring study to confirm the diagnosis of epilepsy or nonepileptic events (that can be cardiac, other neurological conditions, or psychogenic in origin)  I discussed seizure safety and seizure first aid with the patient  Limits would be no unprotected heights (ladders, standing on chairs/tables), should not swim alone (need partners or ), cooking with a partner to avoid burn injuries, showers instead of baths  I reviewed that convulsive seizures typically last about 2 minutes with about 15-30 minutes of recovery  Should a seizure last more than 5 minutes or patient fails to recover after 30 minutes or there are multiple seizures in a day or if there is a suspected head injury then EMS should be called or they go to the nearest emergency room  If she only experiences altered awareness, confusion, or focal seizures, then they may call the office for guidance  Seizure first aid consists of preventing the patient from wandering or removal of dangerous objects or prevention of injury, for convulsive seizures, position the patient on the ground, may place a pillow under the head, turn the patient to her side, no objects or reaching for the mouth, no restraints but prevent injuries by removing glasses or sharp/heavy objects, and time the duration of the seizure  I recommend that she obtain a medical alert bracelet that states "Seizure disorder" or "Epilepsy" along with any medication allergies  We discussed issues related to driving  I explained to the patient that the law states that all patients who have a seizure must be reported to the [place]    Patients cannot legally drive for 6 months after seizure in the state of [place] (6 months in the state [facility] [place] and 3 months in the state of [place] )  She is not cleared to return to driving until she has been without a seizure for at least 6 months and cleared by the appropriate state DMV/DOT  I also informed the patient that, although exceptions can be granted for patients with provoked seizures, exclusively nocturnal seizures, prolonged auras, or exclusively simple partial seizures, these exceptions are granted by the DMV/[place] and not by the physician  In [place], if the patient is driving with a suspended license, it is a summary offense with a $200 fine and may also involve license suspension to be increased by 1-2 years  In [place], if the patient is driving with a suspended license, it may carry a fine of $500-$1000, with the potential for imprisonment and increase of license suspension up to 6 additional months  SEIZURE SAFETY    Don’t let fear of seizures keep you at home  Be smart about your activities to make sure you are safe  The guidelines below can help you be as safe as possible  Make sure the people around you are aware of: What happens when you have a seizure  Correct seizure first aid  First aid for choking (consider taking a basic life support class)  When they should know to call 911 or for medical help    Avoid common triggers of seizures:  Missing your medications  Not getting enough sleep  Drinking alcohol  Using illegal drugs    Safety measures for at home:  In the bathroom:   Do not take baths in the bathtub  Instead, take only showers  Try to have a family member available while you are in the shower  Make sure the drain in the bathtub/shower is working properly to avoid pooling of water  You can consider a fitted shower seat  Recessed soap trays can minimize injury if you would happen to fall in the shower  Bathroom doors can be hung to open outwards, so that the door can still be opened if you fall against the door  Do not lock the bathroom door   Use an “Occupied” sign on the door or other signal to let others know you are in the bathroom  Safety locks can be obtained from the [facility]  On your water heater, set your water temperature to a warm temperature that is not scalding to avoid burns from very hot water  Put non-skid strips/ in the bathtub  Use an electric shaver  Avoid any electrical appliances (including electric shaver) in the bathroom or near water  Use shatterproof glass for mirrors  In the kitchen:   When possible, cook using a microwave  Only cook or use electrical appliances when someone else is in the house and available  As much as possible, grill food and avoid fryers or frying food  Use the back burners of the stove and turn the pot handles toward the back of the stove  Avoid carrying hot pans, pots of boiling water, or very hot food  Serve food or liquids directly from the stove  At the least, minimize the distance hot food is carried  Use precut foods or food processers to limit the need to use knives  Use plastic or durable cups, dishes, and containers instead of breakable glass items  In the bedroom  Low level and wide beds (like a futon) can reduce risk of injury of falling out of bed  If there is a high risk of falling out of bed, you can consider simply putting the mattress on the floor  Avoid sleeping on top bunks of bunk beds  Place a soft carpet on the floor  Around the house  Pad sharp corners of tables, chairs, etc  Round tables and furniture can be considered to avoid sharp corners  Avoid open flames  Place a screen in front of fireplaces and don’t build a fire alone  Allow for open spaces with furniture  Avoid loose throw rugs or slippery floors  Non-slip federica or cushioned federica can help reduce injury from a fall  Fireproof fabrics and furniture are best, and especially important if you smoke    Doors and windows with safety glass are safer if someone falls against them   Avoid lights and heaters that could easily be knocked over  Use curling irons or clothing irons that have automatic shut off switches  Make sure motor driven equipment or lawn mowers have “dead man’s” handles or seats so they will turn off if you release pressure  Safety measures when away from home  [address]  law mandates that you cannot drive for 6 months after your most recent seizure  [place] law mandates that you cannot drive for 6 months after your most recent seizure  Work/Travel  Wear a medical alert bracelet or necklace at all times  Wear a helmet and use protective clothing/equipment when appropriate  Consider telling co-workers and travel companions that you have epilepsy and what to do if you have a seizure  Avoid irregular shifts or disruptions of a regular sleep pattern  Take your medications on time and keep an updated list of medications in your purse or wallet  Do not climb to heights or operate heavy machinery  Stand back from the edges of roads or bus/train platforms when traveling  If you wander when you have a seizure, take a friend along for the trip  Recreation  Swimming can be dangerous  Do not swim alone, in open water, or in murky water that you cannot see the bottom  Caregivers should be with you in the pool at all times and must be physically able to get you out of the water  Use a flotation device  Scuba diving is not recommended since during a seizure people are unaware of their surroundings  Having a seizure underwater can be deadly and can endanger the lives of others  Kayaking and canoeing can be especially dangerous  People with epilepsy are at a higher risk of becoming trapped underneath a canoe or kayak  Wear a helmet when playing contact sports, biking, or if there is a risk of falling  Patients with epilepsy are at a higher risk of head injury when playing contact sports    Avoid riding a bike, running, or other activities around busy roads, steep hills, or secluded areas  Exercise on soft surfaces  Theme Sotomayor: many people with epilepsy can go on rides depending on their type of seizures  For some people, stress or excitement can trigger a seizure  Rollercoasters (especially if you are upside-down) are more dangerous for people with epilepsy  Medications  Take your medications on time  If you have trouble remembering, set alarms on your phone  You can visit www  [email or web address]  com to set up reminders through text message to help you remember to take your medications  Use a pillbox to help you keep track of your medications  When out of the house, take any needed medications with you  Consider keeping one or two extra doses with you in case you are unexpectedly away from home longer than planned  If you realize you missed a dose of your medications and it is less than 2 hours until your next dose, skip the missed dose  Do not double up your medication dose  If it is more than 2 hours until your next dose, you can take the missed dose  Avoid drinking alcohol, since this can enhance effects of your seizure medications  Be aware of common and major side effects of your medications  Keep your medications out of the reach of children  Parenting:  Childproof your home as much as possible  It is possible that you could drop your baby if you have a seizure while holding or feeding them  If you are nursing a baby, sit on the floor or bed with your back supported so the baby will not fall far if you should lose consciousness  Feed the baby while he or she is seated in an infant seat  Dress, change, and sponge bathe the baby on the floor  Move the baby around in a stroller or small crib  Keep a young baby in a playpen when you are alone, and a toddler in an indoor play yard, or childproof one room and use safety montejo at the doors    When out of the house, use a bungee-type cord or restraint harness so your child cannot wander away if you have a seizure that affects your awareness  FIRST AID FOR SEIZURES    What to Do If You Witness a Seizure:     Generalized convulsive (called a generalized tonic-clonic or grand mal seizure)  During this seizure, the person may cry out, suddenly stiffen up, make jerking movements, and fall  The person may turn pale or blue from difficulty breathing  Actions:  Stay calm  Talk in a soothing voice and if possible keep onlookers away  Prevent injury  Move objects away that the person might hit while jerking uncontrollably  Time when the seizure starts and ends  Most seizures stop after only a few minutes  Turn him or her gently onto one side  This will help keep the airway clear  Never place anything in his/her mouth or give him/her anything by mouth during a seizure  -- Do not give the person water, pills, or food until fully alert  Loosen tight clothing or jewelry around his/her neck  Make the person as comfortable as possible  Place something soft under their head  Do not hold the person down  If the person having a seizure thrashes around there is no need for you to restrain them  They are more likely to be combative if restrained  Remember to consider your safety as well  Keep onlookers away  Be sensitive and supportive, and ask others to do the same  Stay with the person until he/she is fully alert  Complex partial seizure (confusional spells)  During this kind of seizure, the person may have a glassy stare; give no response or inappropriate responses when questioned; sit, stand, or walk about aimlessly; make lip smacking or chewing motions; fidget with clothes; appear to be drunk, drugged, or confused  Actions:  Make sure the person is safe and won’t harm themselves  Try to remove harmful objects from around the person (tools, utensils, glasses)  Do NOT be aggressive or attempt to restrain the person  They are more likely to be combative if restrained   Remember to consider your safety as well  Help prevent the person from wandering, and direct the person to chair or safe position  Never place anything in his/her mouth or give him/her anything by mouth during a seizure  -- Do not give the person water, pills, or food until fully alert  Keep onlookers away  Be sensitive and supportive, and ask others to do the same  Stay with the person until he/she is fully alert  CALL 911 if:  A convulsive seizure lasts more than 5 minutes  The person turns blue during the seizure  The person does not start breathing after the seizure  Begin mouth to mouth resuscitation if this would occur  The person has one seizure right after the other without coming back to normal consciousness between the seizures  The person has not regained consciousness or is still confused after 30 minutes  You know the person does not have epilepsy  You know the person has diabetes or low blood sugar  The person is pregnant, ill, or injured  The seizure occurred in water, because the person may have inhaled water  The person requests an ambulance or medical help  Rescue medication  Your doctor may prescribe a rescue medication such as lorazepam (Ativan), diazepam (Valium / Diastat), or clonazepam (Klonopin) to terminate a seizure or if you have a history of cluster of seizures  Follow the instructions given by your doctor for these medications    Recognizing Common Seizures (examples)   Simple partial seizures: Isolated twitching, numbness, sweating, dizziness, nausea/vomiting, disturbances to hearing, vision, smell or taste  No loss of consciousness occurs, and the person remains aware of his/her environment  Complex partial seizures: Staring, motionless, picking at clothes, smacking lips, swallowing repeatedly or wandering around  The person is not aware of their surroundings and is not fully responsive    Atonic seizures: “Drop attacks” or sudden, rapid fall to ground with rapid recovery  Myoclonic seizures: Brief forceful jerks which can affect the whole body or just part of it  Absence seizures: May appear to be daydreaming or “spacing out ” The person is momentarily unresponsive and unaware of what is happening around him/her  Tonic seizures: Stiffening of part or of the entire body  Generalized Tonic-Clonic Seizures  “Grand-mal seizure ” Sudden loss of consciousness with body stiffening followed by continuous jerking movements  A blue tinge around the mouth is likely but lack of oxygen is rare  Loss of bladder and/or bowel control may occur

## 2022-11-27 ENCOUNTER — PATIENT MESSAGE (OUTPATIENT)
Dept: FAMILY MEDICINE CLINIC | Facility: CLINIC | Age: 32
End: 2022-11-27

## 2022-11-27 DIAGNOSIS — G40.909 NONINTRACTABLE EPILEPSY WITHOUT STATUS EPILEPTICUS, UNSPECIFIED EPILEPSY TYPE (HCC): ICD-10-CM

## 2022-11-29 RX ORDER — LAMOTRIGINE 200 MG/1
400 TABLET, EXTENDED RELEASE ORAL DAILY
Qty: 60 TABLET | Refills: 0 | Status: SHIPPED | OUTPATIENT
Start: 2022-11-29

## 2022-12-02 ENCOUNTER — HOSPITAL ENCOUNTER (OUTPATIENT)
Dept: NEUROLOGY | Facility: CLINIC | Age: 32
End: 2022-12-02

## 2022-12-02 DIAGNOSIS — G40.909 NONINTRACTABLE EPILEPSY WITHOUT STATUS EPILEPTICUS, UNSPECIFIED EPILEPSY TYPE (HCC): ICD-10-CM

## 2022-12-06 ENCOUNTER — TELEPHONE (OUTPATIENT)
Dept: NEUROLOGY | Facility: CLINIC | Age: 32
End: 2022-12-06

## 2022-12-06 NOTE — TELEPHONE ENCOUNTER
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

## 2022-12-14 DIAGNOSIS — K21.9 GASTROESOPHAGEAL REFLUX DISEASE WITHOUT ESOPHAGITIS: ICD-10-CM

## 2022-12-14 DIAGNOSIS — G40.909 NONINTRACTABLE EPILEPSY WITHOUT STATUS EPILEPTICUS, UNSPECIFIED EPILEPSY TYPE (HCC): ICD-10-CM

## 2022-12-14 RX ORDER — FOLIC ACID 1 MG/1
1 TABLET ORAL DAILY
Qty: 90 TABLET | Refills: 3 | Status: SHIPPED | OUTPATIENT
Start: 2022-12-14

## 2022-12-14 NOTE — TELEPHONE ENCOUNTER
----- Message from [facility] sent at 12/14/2022 12:54 PM EST -----  Regarding: Famotidine transfer  Contact: [phone]  Just wanted to know if my acid reflux prescription was transferred to [facility] they said it wasn't available

## 2022-12-15 RX ORDER — FAMOTIDINE 20 MG/1
20 TABLET, FILM COATED ORAL DAILY
Qty: 90 TABLET | Refills: 0 | Status: SHIPPED | OUTPATIENT
Start: 2022-12-15

## 2023-01-03 ENCOUNTER — HOSPITAL ENCOUNTER (OUTPATIENT)
Dept: RADIOLOGY | Age: 33
Discharge: HOME | End: 2023-01-03

## 2023-01-03 DIAGNOSIS — G40.909 NONINTRACTABLE EPILEPSY WITHOUT STATUS EPILEPTICUS, UNSPECIFIED EPILEPSY TYPE (HCC): ICD-10-CM

## 2023-01-03 RX ADMIN — GADOBUTROL 12 ML: 604.72 INJECTION INTRAVENOUS at 13:03

## 2023-01-05 RX ORDER — LAMOTRIGINE 200 MG/1
TABLET, EXTENDED RELEASE ORAL
Qty: 60 TABLET | Refills: 0 | Status: SHIPPED | OUTPATIENT
Start: 2023-01-05

## 2023-01-06 ENCOUNTER — TELEPHONE (OUTPATIENT)
Dept: NEUROLOGY | Facility: CLINIC | Age: 33
End: 2023-01-06

## 2023-01-06 NOTE — TELEPHONE ENCOUNTER
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

## 2023-01-13 ENCOUNTER — OFFICE VISIT (OUTPATIENT)
Dept: FAMILY MEDICINE CLINIC | Facility: CLINIC | Age: 33
End: 2023-01-13

## 2023-01-13 VITALS
SYSTOLIC BLOOD PRESSURE: 124 MMHG | OXYGEN SATURATION: 99 % | DIASTOLIC BLOOD PRESSURE: 86 MMHG | HEART RATE: 118 BPM | TEMPERATURE: 98 F | BODY MASS INDEX: 50.13 KG/M2 | HEIGHT: 62 IN | WEIGHT: 272.4 LBS

## 2023-01-13 DIAGNOSIS — G40.909 NONINTRACTABLE EPILEPSY WITHOUT STATUS EPILEPTICUS, UNSPECIFIED EPILEPSY TYPE (HCC): ICD-10-CM

## 2023-01-13 DIAGNOSIS — Z13.220 SCREENING FOR LIPID DISORDERS: ICD-10-CM

## 2023-01-13 DIAGNOSIS — Z00.00 WELL ADULT EXAM: Primary | ICD-10-CM

## 2023-01-13 DIAGNOSIS — N91.2 AMENORRHEA: ICD-10-CM

## 2023-01-13 DIAGNOSIS — Z13.1 SCREENING FOR DIABETES MELLITUS: ICD-10-CM

## 2023-01-13 DIAGNOSIS — Z13.0 SCREENING, ANEMIA, DEFICIENCY, IRON: ICD-10-CM

## 2023-01-13 DIAGNOSIS — K21.9 GASTROESOPHAGEAL REFLUX DISEASE WITHOUT ESOPHAGITIS: ICD-10-CM

## 2023-01-13 DIAGNOSIS — Z11.59 NEED FOR HEPATITIS C SCREENING TEST: ICD-10-CM

## 2023-01-13 DIAGNOSIS — Z13.29 SCREENING FOR THYROID DISORDER: ICD-10-CM

## 2023-01-13 DIAGNOSIS — Z13.29 SCREENING FOR ENDOCRINE DISORDER: ICD-10-CM

## 2023-01-13 RX ORDER — FAMOTIDINE 20 MG/1
20 TABLET, FILM COATED ORAL DAILY
Qty: 90 TABLET | Refills: 0 | Status: SHIPPED | OUTPATIENT
Start: 2023-01-13

## 2023-01-13 NOTE — PATIENT INSTRUCTIONS
Bloodwork at [facility]  Schedule blood work at [address] [place] [address] with gyn Polycystic Ovarian Syndrome   WHAT YOU NEED TO KNOW:   What is polycystic ovarian syndrome (PCOS)? PCOS is a group of symptoms caused by a hormone disorder  Your body produces too many hormones and your ovaries do not work correctly  Your ovaries have fluid-filled sacs with an immature egg in each one  These are called follicles  The follicles grow bigger and make your ovaries look like they have cysts in them  PCOS increases your risk for endometrial cancer and infertility  What causes PCOS? The exact cause of PCOS is not known  Androgens (male hormones), such as testosterone, are normally made by the ovaries  When the ovaries produce too much of these hormones, ovulation stops  High levels of insulin in your blood may also contribute to PCOS  Insulin resistance causes the high levels of insulin in your blood  Insulin resistance happens when your body does not use insulin properly  Your risk may be increased if you have a family member with PCOS or other ovarian disease  What are the signs and symptoms of PCOS? Irregular or absent monthly periods    Extreme hair growth on your face, chest, and back    Acne, thinning hair or baldness on your scalp    Weight gain, especially around your abdomen    High blood sugar levels or high blood pressure    Periods of extreme sadness and extreme happiness    Difficulty getting pregnant    Darkening of the skin around your neck creases, groin, and under your breasts    Skin tags in your armpits, or on your neck    How is PCOS diagnosed? Your healthcare provider will ask about your symptoms and when they began  He or she will ask if you have any family members with PCOS  He or she may ask about your menstrual history, pregnancies, and medicines  You may also need any of the following tests:  Blood tests  measure your hormone levels      A transvaginal ultrasound  uses sound waves to show pictures of the inside of your vagina  The ultrasound wand is placed about 2 or 3 inches inside your vagina  Your healthcare provider will be able to see problems with your ovaries, uterus, fallopian tubes, cervix, or vagina  How is PCOS treated? Medicines  may be given to control your blood sugar  You may need medicines to decrease male hormones, and increase female hormones  These medicines may also improve acne, baldness, and hair growth you do not want  You may also need medicine to help you ovulate if you want to get pregnant  Surgery  to remove your ovaries is rarely done  Surgery will not cure PCOS, but it may help some of your symptoms  Talk with your healthcare provider about what is right for you  How can I manage my symptoms? Manage other health conditions  PCOS increases your risk for diabetes, heart disease, and high blood pressure  Your healthcare provider may send you to specialists that teach you how to manage these conditions  Maintain a healthy weight  Ask your healthcare provider how much you should weigh  Ask him or her to help you create a weight loss plan if you are overweight  Weight loss can help reduce the symptoms of PCOS  You and your healthcare provider can set manageable weight loss goals  Exercise as directed  Exercise can help keep your blood sugar level steady, lower your risk for heart disease, and help you lose weight  Exercise for at least 150 minutes every week  Spread this amount of exercise over at least 3 days in the week  Do not skip exercise more than 2 days in a row  Include muscle strengthening activities 2 to 3 days each week  Examples of exercise include walking or swimming  Do not sit for longer than 30 minutes at a time  Work with your healthcare provider to create an exercise plan that is right for you  Eat a variety of healthy foods  Healthy foods include fruits, vegetables, whole-grain breads, low-fat dairy products, beans, lean meats, and fish  A dietitian may help you plan meals to help manage your other health conditions  When should I call my doctor or gynecologist?   Your symptoms get worse  You have questions or concerns about your condition or care  CARE AGREEMENT:   You have the right to help plan your care  Learn about your health condition and how it may be treated  Discuss treatment options with your healthcare providers to decide what care you want to receive  You always have the right to refuse treatment  The above information is an  only  It is not intended as medical advice for individual conditions or treatments  Talk to your doctor, nurse or pharmacist before following any medical regimen to see if it is safe and effective for you  © Copyright [address] 2022 Information is for End User's use only and may not be sold, redistributed or otherwise used for commercial purposes  All illustrations and images included in CareNotes® are the copyrighted property of A D A M , Inc  or [facility] Visit for Adults   AMBULATORY CARE:   A wellness visit  is when you see your healthcare provider to get screened for health problems  Your healthcare provider will also give you advice on how to stay healthy  Write down your questions so you remember to ask them  Ask your healthcare provider how often you should have a wellness visit  What happens at a wellness visit:  Your healthcare provider will ask about your health, and your family history of health problems  This includes high blood pressure, heart disease, and cancer  He or she will ask if you have symptoms that concern you, if you smoke, and about your mood  You may also be asked about your intake of medicines, supplements, food, and alcohol  Any of the following may be done: Your weight  will be checked  Your height may also be checked so your body mass index (BMI) can be calculated  Your BMI shows if you are at a healthy weight      Your blood pressure  and heart rate will be checked  Your temperature may also be checked  Blood and urine tests  may be done  Blood tests may be done to check your cholesterol levels  Abnormal cholesterol levels increase your risk for heart disease and stroke  You may also need a blood or urine test to check for diabetes if you are at increased risk  Urine tests may be done to look for signs of an infection or kidney disease  A physical exam  includes checking your heartbeat and lungs with a stethoscope  Your healthcare provider may also check your skin to look for sun damage  Screening tests  may be recommended  A screening test is done to check for diseases that may not cause symptoms  The screening tests you may need depend on your age, gender, family history, and lifestyle habits  For example, colorectal screening may be recommended if you are 48years old or older  Screening tests you need if you are a woman:   A Pap smear  is used to screen for cervical cancer  Pap smears are usually done every 3 to 5 years depending on your age  You may need them more often if you have had abnormal Pap smear test results in the past  Ask your healthcare provider how often you should have a Pap smear  A mammogram  is an x-ray of your breasts to screen for breast cancer  Experts recommend mammograms every 2 years starting at age 48 years  You may need a mammogram at age 52 years or younger if you have an increased risk for breast cancer  Talk to your healthcare provider about when you should start having mammograms and how often you need them  Vaccines you may need:   Get an influenza vaccine  every year  The influenza vaccine protects you from the flu  Several types of viruses cause the flu  The viruses change over time, so new vaccines are made each year  Get a tetanus-diphtheria (Td) booster vaccine  every 10 years  This vaccine protects you against tetanus and diphtheria   Tetanus is a severe infection that may cause painful muscle spasms and lockjaw  Diphtheria is a severe bacterial infection that causes a thick covering in the back of your mouth and throat  Get a human papillomavirus (HPV) vaccine  if you are female and aged 23 to 32 or male 23 to 24 and never received it  This vaccine protects you from HPV infection  HPV is the most common infection spread by sexual contact  HPV may also cause vaginal, penile, and anal cancers  Get a pneumococcal vaccine  if you are aged 72 years or older  The pneumococcal vaccine is an injection given to protect you from pneumococcal disease  Pneumococcal disease is an infection caused by pneumococcal bacteria  The infection may cause pneumonia, meningitis, or an ear infection  Get a shingles vaccine  if you are 60 or older, even if you have had shingles before  The shingles vaccine is an injection to protect you from the varicella-zoster virus  This is the same virus that causes chickenpox  Shingles is a painful rash that develops in people who had chickenpox or have been exposed to the virus  How to eat healthy:  My Plate is a model for planning healthy meals  It shows the types and amounts of foods that should go on your plate  Fruits and vegetables make up about half of your plate, and grains and protein make up the other half  A serving of dairy is included on the side of your plate  The amount of calories and serving sizes you need depends on your age, gender, weight, and height  Examples of healthy foods are listed below:  Eat a variety of vegetables  such as dark green, red, and orange vegetables  You can also include canned vegetables low in sodium (salt) and frozen vegetables without added butter or sauces  Eat a variety of fresh fruits , canned fruit in 100% juice, frozen fruit, and dried fruit  Include whole grains  At least half of the grains you eat should be whole grains   Examples include whole-wheat bread, wheat pasta, brown rice, and whole-grain cereals such as oatmeal     Eat a variety of protein foods such as seafood (fish and shellfish), lean meat, and poultry without skin (turkey and chicken)  Examples of lean meats include pork leg, shoulder, or tenderloin, and beef round, sirloin, tenderloin, and extra lean ground beef  Other protein foods include eggs and egg substitutes, beans, peas, soy products, nuts, and seeds  Choose low-fat dairy products such as skim or 1% milk or low-fat yogurt, cheese, and cottage cheese  Limit unhealthy fats  such as butter, hard margarine, and shortening  Exercise:  Exercise at least 30 minutes per day on most days of the week  Some examples of exercise include walking, biking, dancing, and swimming  You can also fit in more physical activity by taking the stairs instead of the elevator or parking farther away from stores  Include muscle strengthening activities 2 days each week  Regular exercise provides many health benefits  It helps you manage your weight, and decreases your risk for type 2 diabetes, heart disease, stroke, and high blood pressure  Exercise can also help improve your mood  Ask your healthcare provider about the best exercise plan for you  General health and safety guidelines:   Do not smoke  Nicotine and other chemicals in cigarettes and cigars can cause lung damage  Ask your healthcare provider for information if you currently smoke and need help to quit  E-cigarettes or smokeless tobacco still contain nicotine  Talk to your healthcare provider before you use these products  Limit alcohol  A drink of alcohol is 12 ounces of beer, 5 ounces of wine, or 1½ ounces of liquor  Lose weight, if needed  Being overweight increases your risk of certain health conditions  These include heart disease, high blood pressure, type 2 diabetes, and certain types of cancer  Protect your skin  Do not sunbathe or use tanning beds  Use sunscreen with a SPF 15 or higher   Apply sunscreen at least 15 minutes before you go outside  Reapply sunscreen every 2 hours  Wear protective clothing, hats, and sunglasses when you are outside  Drive safely  Always wear your seatbelt  Make sure everyone in your car wears a seatbelt  A seatbelt can save your life if you are in an accident  Do not use your cell phone when you are driving  This could distract you and cause an accident  Pull over if you need to make a call or send a text message  Practice safe sex  Use latex condoms if are sexually active and have more than one partner  Your healthcare provider may recommend screening tests for sexually transmitted infections (STIs)  Wear helmets, lifejackets, and protective gear  Always wear a helmet when you ride a bike or motorcycle, go skiing, or play sports that could cause a head injury  Wear protective equipment when you play sports  Wear a lifejacket when you are on a boat or doing water sports  © Copyright [employer] 2022 Information is for End User's use only and may not be sold, redistributed or otherwise used for commercial purposes  All illustrations and images included in CareNotes® are the copyrighted property of [employer]  or [facility] Cristina Leon   The above information is an  only  It is not intended as medical advice for individual conditions or treatments  Talk to your doctor, nurse or pharmacist before following any medical regimen to see if it is safe and effective for you

## 2023-01-13 NOTE — PROGRESS NOTES
Zulema Montoya is a 33 y o   female and is here for routine health maintenance  The patient reports no problems  History of Present Illness     Pt is seen for a physical today  Pt has not had menstrual cycle since 2012,         Well Adult Physical   Patient here for a comprehensive physical exam       Diet and Physical Activity  Diet: well balanced diet  Weight concerns: Patient has class 3 obesity (BMI >40)  Exercise: frequently      Depression Screen  PHQ-2/9 Depression Screening            General Health  Hearing: Normal:  bilateral  Vision: no vision problems  Dental: regular dental visits     History:  LMP: No LMP recorded  (Menstrual status: Amenorrheic other)  Cancer Screening  Colononoscopy not indicated  Mammogram not indicated  Pap will schedule with gyn  Abnormal pap? no  Smoker NO Annual screening with low-dose helical computed tomography (CT) for patients age 54 to 76 years with history of smoking at least 30 pack-years and, if a former smoker, had quit within the previous 15 years      The following portions of the patient's history were reviewed and updated as appropriate: allergies, current medications, past family history, past medical history, past social history, past surgical history and problem list     Review of Systems     Review of Systems   Constitutional: Negative  Negative for fatigue and fever  HENT: Negative  Eyes: Negative  Respiratory: Negative  Negative for cough  Cardiovascular: Negative  Gastrointestinal: Negative  Endocrine: Negative  Genitourinary: Positive for menstrual problem  Musculoskeletal: Negative  Skin: Negative  Allergic/Immunologic: Negative  Neurological: Negative  Psychiatric/Behavioral: Negative          Past Medical History     Past Medical History:   Diagnosis Date   • Bipolar 2 disorder ([facility] [address] )    • Epilepsy [facility])        Past Surgical History     Past Surgical History:   Procedure Laterality Date   • WISDOM TOOTH EXTRACTION         Social History     Social History     Socioeconomic History   • Marital status: /Civil Union     Spouse name: None   • Number of children: None   • Years of education: None   • Highest education level: None   Occupational History   • None   Tobacco Use   • Smoking status: Never   • Smokeless tobacco: Never   Vaping Use   • Vaping Use: Never used   Substance and Sexual Activity   • Alcohol use: Never   • Drug use: Never   • Sexual activity: None   Other Topics Concern   • None   Social History Narrative   • None     Social Determinants of Health     Financial Resource Strain: Not on file   Food Insecurity: Not on file   Transportation Needs: Not on file   Physical Activity: Not on file   Stress: Not on file   Social Connections: Not on file   Intimate Partner Violence: Not on file   Housing Stability: Not on file       Family History     Family History   Problem Relation Age of Onset   • Bipolar disorder Mother    • Seizures Mother    • Depression Mother        Current Medications       Current Outpatient Medications:   •  famotidine (PEPCID) 20 mg tablet, Take 1 tablet (20 mg total) by mouth daily, Disp: 90 tablet, Rfl: 0  •  lamoTRIgine  MG TB24, take 2 tablets once daily, Disp: 60 tablet, Rfl: 0  •  Multiple Vitamins-Minerals (Spectravite) TABS, , Disp: , Rfl:      Allergies     Allergies   Allergen Reactions   • Ibuprofen & Acetaminophen Dizziness, Headache and Vomiting   • Monosodium Glutamate - Food Allergy Dizziness, Headache and Vomiting   • Penicillins Dizziness, Headache and Vomiting       Objective     /86   Pulse (!) 118   Temp 98 °F (36 7 °C)   Ht 5' 2" (1 575 m)   Wt 124 kg (272 lb 6 4 oz)   SpO2 99%   BMI 49 82 kg/m²      Physical Exam  Vitals and nursing note reviewed  Constitutional:       Appearance: She is well-developed  She is obese  HENT:      Head: Normocephalic        Right Ear: External ear normal       Left Ear: External ear normal  Nose: Nose normal    Eyes:      Conjunctiva/sclera: Conjunctivae normal       Pupils: Pupils are equal, round, and reactive to light  Cardiovascular:      Rate and Rhythm: Normal rate and regular rhythm  Heart sounds: Normal heart sounds  Pulmonary:      Effort: Pulmonary effort is normal       Breath sounds: Normal breath sounds  Abdominal:      General: Bowel sounds are normal       Palpations: Abdomen is soft  Musculoskeletal:      Cervical back: Normal range of motion and neck supple  Skin:     General: Skin is warm and dry  Neurological:      Mental Status: She is alert and oriented to person, place, and time  Psychiatric:         Behavior: Behavior normal          Thought Content: Thought content normal          Judgment: Judgment normal            No results found      Health Maintenance     Health Maintenance   Topic Date Due   • Hepatitis C Screening  Never done   • COVID-19 Vaccine (1) Never done   • HIV Screening  Never done   • Cervical Cancer Screening  Never done   • BMI: Followup Plan  02/19/2023   • Influenza Vaccine (1) 06/30/2023 (Originally 9/1/2022)   • BMI: Adult  01/13/2024   • Annual Physical  01/13/2024   • DTaP,Tdap,and Td Vaccines (7 - Td or Tdap) 08/01/2024   • HIB Vaccine  Completed   • HPV Vaccine  Completed   • Pneumococcal Vaccine: Pediatrics (0 to 5 Years) and At-Risk Patients (6 to 59 Years)  Aged Out   • IPV Vaccine  Aged Out   • Hepatitis A Vaccine  Aged Out   • Meningococcal ACWY Vaccine  Aged [employer] History   Administered Date(s) Administered   • DTaP 04/21/1992, 07/28/1993, 03/07/1995, 05/16/1995   • HPV Quadrivalent 05/07/2009, 06/11/2010, 09/20/2011   • Hep B, Adolescent or Pediatric 05/12/1999, 06/16/1999, 11/26/1999   • HiB 04/21/1992, 03/07/1995, 05/16/1995   • INFLUENZA 08/22/2016, 02/26/2021, 08/27/2021   • IPV 04/21/1992, 03/07/1995, 05/16/1995   • MMR 03/07/1995, 04/12/2000   • Td (adult), adsorbed 05/17/2005   • Tdap 05/07/2009, 08/01/2014   • Varicella 06/16/1999, 05/07/2009       Assessment/Plan         1  Healthy female exam   2  Patient Counseling:   · Nutrition: Stressed importance of a well balanced diet, moderation of sodium/saturated fat, caloric balance and sufficient intake of fiber  · Exercise: Stressed the importance of regular exercise with a goal of 150 minutes per week  · Dental Health: Discussed daily flossing and brushing and regular dental visits     · Immunizations reviewed  · Discussed benefits of screening   · Discussed the patient's BMI with her  The BMI is above average; BMI management plan is completed  3  Cancer Screening   4  Labs   5    6  Follow up in one year      Vance Meléndez DO

## 2023-01-15 PROBLEM — K21.9 GASTROESOPHAGEAL REFLUX DISEASE WITHOUT ESOPHAGITIS: Status: ACTIVE | Noted: 2023-01-15

## 2023-02-01 LAB
ALBUMIN SERPL-MCNC: 4.4 G/DL (ref 3.8–4.8)
ALBUMIN/GLOB SERPL: 1.4 {RATIO} (ref 1.2–2.2)
ALP SERPL-CCNC: 101 IU/L (ref 44–121)
ALT SERPL-CCNC: 86 IU/L (ref 0–32)
AST SERPL-CCNC: 44 IU/L (ref 0–40)
BASOPHILS # BLD AUTO: 0 X10E3/UL (ref 0–0.2)
BASOPHILS NFR BLD AUTO: 0 %
BILIRUB SERPL-MCNC: 0.3 MG/DL (ref 0–1.2)
BUN SERPL-MCNC: 13 MG/DL (ref 6–20)
BUN/CREAT SERPL: 14 (ref 9–23)
CALCIUM SERPL-MCNC: 9.7 MG/DL (ref 8.7–10.2)
CHLORIDE SERPL-SCNC: 101 MMOL/L (ref 96–106)
CHOLEST SERPL-MCNC: 216 MG/DL (ref 100–199)
CO2 SERPL-SCNC: 23 MMOL/L (ref 20–29)
CREAT SERPL-MCNC: 0.95 MG/DL (ref 0.57–1)
EGFR: 82 ML/MIN/1.73
EOSINOPHIL # BLD AUTO: 0.3 X10E3/UL (ref 0–0.4)
EOSINOPHIL NFR BLD AUTO: 5 %
ERYTHROCYTE [DISTWIDTH] IN BLOOD BY AUTOMATED COUNT: 13 % (ref 11.7–15.4)
GLOBULIN SER-MCNC: 3.1 G/DL (ref 1.5–4.5)
GLUCOSE SERPL-MCNC: 97 MG/DL (ref 70–99)
HCT VFR BLD AUTO: 42.1 % (ref 34–46.6)
HCV AB S/CO SERPL IA: <0.1 S/CO RATIO (ref 0–0.9)
HDLC SERPL-MCNC: 75 MG/DL
HGB BLD-MCNC: 14.3 G/DL (ref 11.1–15.9)
IMM GRANULOCYTES # BLD: 0 X10E3/UL (ref 0–0.1)
IMM GRANULOCYTES NFR BLD: 0 %
LDLC SERPL CALC-MCNC: 127 MG/DL (ref 0–99)
LDLC/HDLC SERPL: 1.7 RATIO (ref 0–3.2)
LYMPHOCYTES # BLD AUTO: 3.2 X10E3/UL (ref 0.7–3.1)
LYMPHOCYTES NFR BLD AUTO: 44 %
MCH RBC QN AUTO: 29.6 PG (ref 26.6–33)
MCHC RBC AUTO-ENTMCNC: 34 G/DL (ref 31.5–35.7)
MCV RBC AUTO: 87 FL (ref 79–97)
MONOCYTES # BLD AUTO: 0.5 X10E3/UL (ref 0.1–0.9)
MONOCYTES NFR BLD AUTO: 7 %
NEUTROPHILS # BLD AUTO: 3.1 X10E3/UL (ref 1.4–7)
NEUTROPHILS NFR BLD AUTO: 44 %
PLATELET # BLD AUTO: 311 X10E3/UL (ref 150–450)
POTASSIUM SERPL-SCNC: 4.5 MMOL/L (ref 3.5–5.2)
PROT SERPL-MCNC: 7.5 G/DL (ref 6–8.5)
RBC # BLD AUTO: 4.83 X10E6/UL (ref 3.77–5.28)
SL AMB VLDL CHOLESTEROL CALC: 14 MG/DL (ref 5–40)
SODIUM SERPL-SCNC: 138 MMOL/L (ref 134–144)
TRIGL SERPL-MCNC: 80 MG/DL (ref 0–149)
TSH SERPL DL<=0.005 MIU/L-ACNC: 2.43 UIU/ML (ref 0.45–4.5)
WBC # BLD AUTO: 7.1 X10E3/UL (ref 3.4–10.8)

## 2023-02-02 LAB
25(OH)D3+25(OH)D2 SERPL-MCNC: 31 NG/ML (ref 30–100)
ALBUMIN SERPL-MCNC: 4.5 G/DL (ref 3.8–4.8)
ALBUMIN/GLOB SERPL: 1.3 {RATIO} (ref 1.2–2.2)
ALP SERPL-CCNC: 99 IU/L (ref 44–121)
ALT SERPL-CCNC: 86 IU/L (ref 0–32)
AST SERPL-CCNC: 43 IU/L (ref 0–40)
BILIRUB SERPL-MCNC: 0.3 MG/DL (ref 0–1.2)
BUN SERPL-MCNC: 13 MG/DL (ref 6–20)
BUN/CREAT SERPL: 15 (ref 9–23)
CALCIUM SERPL-MCNC: 9.9 MG/DL (ref 8.7–10.2)
CHLORIDE SERPL-SCNC: 102 MMOL/L (ref 96–106)
CO2 SERPL-SCNC: 23 MMOL/L (ref 20–29)
CREAT SERPL-MCNC: 0.84 MG/DL (ref 0.57–1)
EGFR: 95 ML/MIN/1.73
ERYTHROCYTE [DISTWIDTH] IN BLOOD BY AUTOMATED COUNT: 13.3 % (ref 11.7–15.4)
GLOBULIN SER-MCNC: 3.4 G/DL (ref 1.5–4.5)
GLUCOSE SERPL-MCNC: 100 MG/DL (ref 70–99)
HCT VFR BLD AUTO: 40.6 % (ref 34–46.6)
HGB BLD-MCNC: 14.1 G/DL (ref 11.1–15.9)
LAMOTRIGINE SERPL-MCNC: 15.4 UG/ML (ref 2–20)
MCH RBC QN AUTO: 29.5 PG (ref 26.6–33)
MCHC RBC AUTO-ENTMCNC: 34.7 G/DL (ref 31.5–35.7)
MCV RBC AUTO: 85 FL (ref 79–97)
PLATELET # BLD AUTO: 307 X10E3/UL (ref 150–450)
POTASSIUM SERPL-SCNC: 4.5 MMOL/L (ref 3.5–5.2)
PROT SERPL-MCNC: 7.9 G/DL (ref 6–8.5)
RBC # BLD AUTO: 4.78 X10E6/UL (ref 3.77–5.28)
SODIUM SERPL-SCNC: 139 MMOL/L (ref 134–144)
WBC # BLD AUTO: 7.1 X10E3/UL (ref 3.4–10.8)

## 2023-02-06 DIAGNOSIS — G40.909 NONINTRACTABLE EPILEPSY WITHOUT STATUS EPILEPTICUS, UNSPECIFIED EPILEPSY TYPE (HCC): ICD-10-CM

## 2023-02-06 RX ORDER — LAMOTRIGINE 200 MG/1
TABLET, EXTENDED RELEASE ORAL
Qty: 60 TABLET | Refills: 0 | Status: SHIPPED | OUTPATIENT
Start: 2023-02-06 | End: 2023-02-06 | Stop reason: SDUPTHER

## 2023-02-06 RX ORDER — LAMOTRIGINE 200 MG/1
2 TABLET, EXTENDED RELEASE ORAL DAILY
Qty: 60 TABLET | Refills: 0 | Status: CANCELLED | OUTPATIENT
Start: 2023-02-06

## 2023-02-07 RX ORDER — LAMOTRIGINE 200 MG/1
2 TABLET, EXTENDED RELEASE ORAL DAILY
Qty: 60 TABLET | Refills: 0 | Status: SHIPPED | OUTPATIENT
Start: 2023-02-07

## 2023-02-10 DIAGNOSIS — K21.9 GASTROESOPHAGEAL REFLUX DISEASE WITHOUT ESOPHAGITIS: ICD-10-CM

## 2023-02-10 RX ORDER — FAMOTIDINE 20 MG/1
20 TABLET, FILM COATED ORAL DAILY
Qty: 90 TABLET | Refills: 0 | Status: SHIPPED | OUTPATIENT
Start: 2023-02-10

## 2023-03-09 DIAGNOSIS — G40.909 NONINTRACTABLE EPILEPSY WITHOUT STATUS EPILEPTICUS, UNSPECIFIED EPILEPSY TYPE (HCC): ICD-10-CM

## 2023-03-09 RX ORDER — LAMOTRIGINE 200 MG/1
TABLET, EXTENDED RELEASE ORAL
Qty: 60 TABLET | Refills: 0 | Status: SHIPPED | OUTPATIENT
Start: 2023-03-09

## 2023-03-16 PROBLEM — Z00.00 WELL ADULT EXAM: Status: RESOLVED | Noted: 2021-04-26 | Resolved: 2023-03-16

## 2023-04-06 DIAGNOSIS — G40.909 NONINTRACTABLE EPILEPSY WITHOUT STATUS EPILEPTICUS, UNSPECIFIED EPILEPSY TYPE (HCC): ICD-10-CM

## 2023-04-06 RX ORDER — LAMOTRIGINE 200 MG/1
TABLET, EXTENDED RELEASE ORAL
Qty: 60 TABLET | Refills: 0 | Status: SHIPPED | OUTPATIENT
Start: 2023-04-06

## 2023-05-08 DIAGNOSIS — G40.909 NONINTRACTABLE EPILEPSY WITHOUT STATUS EPILEPTICUS, UNSPECIFIED EPILEPSY TYPE (HCC): ICD-10-CM

## 2023-05-08 RX ORDER — LAMOTRIGINE 200 MG/1
TABLET, EXTENDED RELEASE ORAL
Qty: 60 TABLET | Refills: 0 | Status: SHIPPED | OUTPATIENT
Start: 2023-05-08

## 2023-06-07 DIAGNOSIS — G40.909 NONINTRACTABLE EPILEPSY WITHOUT STATUS EPILEPTICUS, UNSPECIFIED EPILEPSY TYPE (HCC): ICD-10-CM

## 2023-06-07 RX ORDER — LAMOTRIGINE 200 MG/1
TABLET, EXTENDED RELEASE ORAL
Qty: 60 TABLET | Refills: 0 | Status: SHIPPED | OUTPATIENT
Start: 2023-06-07

## 2023-07-06 DIAGNOSIS — G40.909 NONINTRACTABLE EPILEPSY WITHOUT STATUS EPILEPTICUS, UNSPECIFIED EPILEPSY TYPE (HCC): ICD-10-CM

## 2023-07-06 RX ORDER — LAMOTRIGINE 200 MG/1
TABLET, EXTENDED RELEASE ORAL
Qty: 60 TABLET | Refills: 0 | Status: SHIPPED | OUTPATIENT
Start: 2023-07-06

## 2023-08-06 DIAGNOSIS — G40.909 NONINTRACTABLE EPILEPSY WITHOUT STATUS EPILEPTICUS, UNSPECIFIED EPILEPSY TYPE (HCC): ICD-10-CM

## 2023-08-06 RX ORDER — LAMOTRIGINE 200 MG/1
TABLET, EXTENDED RELEASE ORAL
Qty: 60 TABLET | Refills: 2 | Status: SHIPPED | OUTPATIENT
Start: 2023-08-06 | End: 2023-09-19 | Stop reason: SDUPTHER

## 2023-09-19 DIAGNOSIS — G40.909 NONINTRACTABLE EPILEPSY WITHOUT STATUS EPILEPTICUS, UNSPECIFIED EPILEPSY TYPE (HCC): ICD-10-CM

## 2023-09-20 RX ORDER — LAMOTRIGINE 200 MG/1
2 TABLET, EXTENDED RELEASE ORAL DAILY
Qty: 60 TABLET | Refills: 0 | Status: SHIPPED | OUTPATIENT
Start: 2023-09-20

## 2023-10-25 DIAGNOSIS — K21.9 GASTROESOPHAGEAL REFLUX DISEASE WITHOUT ESOPHAGITIS: ICD-10-CM

## 2023-10-25 RX ORDER — FAMOTIDINE 20 MG/1
20 TABLET, FILM COATED ORAL DAILY
Qty: 90 TABLET | Refills: 0 | Status: SHIPPED | OUTPATIENT
Start: 2023-10-25 | End: 2023-11-03 | Stop reason: ALTCHOICE

## 2023-11-03 DIAGNOSIS — G40.909 NONINTRACTABLE EPILEPSY WITHOUT STATUS EPILEPTICUS, UNSPECIFIED EPILEPSY TYPE (HCC): ICD-10-CM

## 2023-11-03 RX ORDER — LAMOTRIGINE 200 MG/1
2 TABLET, EXTENDED RELEASE ORAL DAILY
Qty: 60 TABLET | Refills: 0 | OUTPATIENT
Start: 2023-11-03

## 2023-11-03 RX ORDER — LAMOTRIGINE 200 MG/1
2 TABLET, EXTENDED RELEASE ORAL DAILY
Qty: 60 TABLET | Refills: 0 | Status: CANCELLED | OUTPATIENT
Start: 2023-11-03

## 2023-11-03 RX ORDER — LAMOTRIGINE 200 MG/1
2 TABLET, EXTENDED RELEASE ORAL DAILY
Qty: 60 TABLET | Refills: 0 | Status: SHIPPED | OUTPATIENT
Start: 2023-11-03 | End: 2023-12-03

## 2023-11-03 NOTE — TELEPHONE ENCOUNTER
Patient advised medication filled at [address], [address] [facility] and sent  due for appt and to call back to schedule

## 2023-12-02 DIAGNOSIS — G40.909 NONINTRACTABLE EPILEPSY WITHOUT STATUS EPILEPTICUS, UNSPECIFIED EPILEPSY TYPE (HCC): ICD-10-CM

## 2023-12-03 RX ORDER — LAMOTRIGINE 200 MG/1
2 TABLET, EXTENDED RELEASE ORAL DAILY
Qty: 60 TABLET | Refills: 0 | Status: SHIPPED | OUTPATIENT
Start: 2023-12-03

## 2023-12-30 DIAGNOSIS — G40.909 NONINTRACTABLE EPILEPSY WITHOUT STATUS EPILEPTICUS, UNSPECIFIED EPILEPSY TYPE (HCC): ICD-10-CM

## 2024-01-02 DIAGNOSIS — G40.909 NONINTRACTABLE EPILEPSY WITHOUT STATUS EPILEPTICUS, UNSPECIFIED EPILEPSY TYPE (HCC): ICD-10-CM

## 2024-01-02 RX ORDER — LAMOTRIGINE 200 MG/1
2 TABLET, EXTENDED RELEASE ORAL DAILY
Qty: 60 TABLET | Refills: 0 | Status: CANCELLED | OUTPATIENT
Start: 2024-01-02

## 2024-01-02 RX ORDER — LAMOTRIGINE 200 MG/1
TABLET, EXTENDED RELEASE ORAL
Qty: 60 TABLET | Refills: 0 | OUTPATIENT
Start: 2024-01-02

## 2024-01-02 RX ORDER — LAMOTRIGINE 200 MG/1
2 TABLET, EXTENDED RELEASE ORAL DAILY
Qty: 60 TABLET | Refills: 0 | Status: SHIPPED | OUTPATIENT
Start: 2024-01-02

## 2024-01-02 NOTE — TELEPHONE ENCOUNTER
Called Pt. JOIE for Pt, and Pt advised  1/13  or after due for yearly visit physical and her med was sent to [id number] - [place] - [address] [facility]

## 2024-01-02 NOTE — TELEPHONE ENCOUNTER
Pt coming in for her phsycal on the 15th that when she is due can you refilled her  meds she will be out tomorrow

## 2024-01-15 ENCOUNTER — OFFICE VISIT (OUTPATIENT)
Dept: FAMILY MEDICINE CLINIC | Facility: CLINIC | Age: 34
End: 2024-01-15
Payer: COMMERCIAL

## 2024-01-15 VITALS
HEIGHT: 62 IN | SYSTOLIC BLOOD PRESSURE: 120 MMHG | WEIGHT: 266 LBS | TEMPERATURE: 97.1 F | OXYGEN SATURATION: 98 % | BODY MASS INDEX: 48.95 KG/M2 | DIASTOLIC BLOOD PRESSURE: 70 MMHG | HEART RATE: 102 BPM

## 2024-01-15 DIAGNOSIS — M62.838 MUSCLE SPASM OF LEFT SHOULDER: ICD-10-CM

## 2024-01-15 DIAGNOSIS — Z00.00 WELL ADULT EXAM: Primary | ICD-10-CM

## 2024-01-15 DIAGNOSIS — J45.20 MILD INTERMITTENT ASTHMA WITHOUT COMPLICATION: ICD-10-CM

## 2024-01-15 DIAGNOSIS — F31.81 BIPOLAR 2 DISORDER (HCC): ICD-10-CM

## 2024-01-15 DIAGNOSIS — Z13.220 SCREENING FOR LIPID DISORDERS: ICD-10-CM

## 2024-01-15 DIAGNOSIS — Z13.228 SCREENING FOR ENDOCRINE, METABOLIC AND IMMUNITY DISORDER: ICD-10-CM

## 2024-01-15 DIAGNOSIS — Z13.1 SCREENING FOR DIABETES MELLITUS (DM): ICD-10-CM

## 2024-01-15 DIAGNOSIS — Z13.29 SCREENING FOR ENDOCRINE, METABOLIC AND IMMUNITY DISORDER: ICD-10-CM

## 2024-01-15 DIAGNOSIS — Z13.0 SCREENING FOR ENDOCRINE, METABOLIC AND IMMUNITY DISORDER: ICD-10-CM

## 2024-01-15 DIAGNOSIS — Z13.0 SCREENING FOR DEFICIENCY ANEMIA: ICD-10-CM

## 2024-01-15 DIAGNOSIS — G40.909 NONINTRACTABLE EPILEPSY WITHOUT STATUS EPILEPTICUS, UNSPECIFIED EPILEPSY TYPE (HCC): ICD-10-CM

## 2024-01-15 DIAGNOSIS — M62.838 MUSCLE SPASM: ICD-10-CM

## 2024-01-15 PROCEDURE — 99213 OFFICE O/P EST LOW 20 MIN: CPT | Performed by: FAMILY MEDICINE

## 2024-01-15 PROCEDURE — 99395 PREV VISIT EST AGE 18-39: CPT | Performed by: FAMILY MEDICINE

## 2024-01-15 RX ORDER — TIZANIDINE 4 MG/1
4 TABLET ORAL EVERY 8 HOURS PRN
Qty: 10 TABLET | Refills: 0 | Status: SHIPPED | OUTPATIENT
Start: 2024-01-15

## 2024-01-15 RX ORDER — LAMOTRIGINE 200 MG/1
2 TABLET, EXTENDED RELEASE ORAL DAILY
Qty: 180 TABLET | Refills: 0 | Status: SHIPPED | OUTPATIENT
Start: 2024-01-15

## 2024-01-15 RX ORDER — ALBUTEROL SULFATE 90 UG/1
2 AEROSOL, METERED RESPIRATORY (INHALATION) 4 TIMES DAILY
Qty: 6.7 G | Refills: 1 | Status: SHIPPED | OUTPATIENT
Start: 2024-01-15

## 2024-01-15 NOTE — PATIENT INSTRUCTIONS
Schedule pap  Schedule fasting blood work   Tizanidine 4mg 3 times a day as needed for left shoulder

## 2024-01-21 PROBLEM — J45.20 MILD INTERMITTENT ASTHMA WITHOUT COMPLICATION: Status: ACTIVE | Noted: 2024-01-21

## 2024-01-21 PROBLEM — M62.838 MUSCLE SPASM OF LEFT SHOULDER: Status: ACTIVE | Noted: 2024-01-21

## 2024-01-22 NOTE — PROGRESS NOTES
ADULT ANNUAL PHYSICAL  [facility]    NAME: Dwain Bullock  AGE: 33 y.o. SEX: female  : 1990     DATE: 2024     Assessment and Plan:     1. Well adult exam    2. Muscle spasm of left shoulder  -     tiZANidine (ZANAFLEX) 4 mg tablet; Take 1 tablet (4 mg total) by mouth every 8 (eight) hours as needed for muscle spasms    3. Muscle spasm    4. Mild intermittent asthma without complication  Assessment & Plan:  Requesting renewal of albuterol     Orders:  -     albuterol (Ventolin HFA) 90 mcg/act inhaler; Inhale 2 puffs 4 (four) times a day    5. Nonintractable epilepsy without status epilepticus, unspecified epilepsy type (HCC)  Assessment & Plan:  Stable on lamictal    Orders:  -     lamoTRIgine  MG TB24; Take 2 tablets (400 mg total) by mouth daily    6. Bipolar 2 disorder (HCC)  -     Lamotrigine level; Future  -     Lamotrigine level    7. Screening for deficiency anemia  -     CBC and differential; Future  -     CBC and differential    8. Screening for diabetes mellitus (DM)  -     Comprehensive metabolic panel; Future  -     Comprehensive metabolic panel    9. Screening for endocrine, metabolic and immunity disorder  -     Comprehensive metabolic panel; Future  -     TSH, 3rd generation with Free T4 reflex; Future  -     Comprehensive metabolic panel  -     TSH, 3rd generation with Free T4 reflex    10. Screening for lipid disorders  -     Lipid Panel with Direct LDL reflex; Future  -     Lipid Panel with Direct LDL reflex    11. BMI 45.0-49.9, adult (HCC)        Immunizations and preventive care screenings were discussed with patient today. Appropriate education was printed on patient's after visit summary.    Counseling:  Dental Health: discussed importance of regular tooth brushing, flossing, and dental visits.  Exercise: the importance of regular exercise/physical activity was discussed. Recommend exercise 3-5 times per week for at least  30 minutes.          No follow-ups on file.     Chief Complaint:     Chief Complaint   Patient presents with    Physical Exam     Physical   Pend bw    Pain in shoulder near neck area       History of Present Illness:     Pt is here for a physical today.   Complains of shoulder and neck pain. Denies injury.           Review of Systems:     Review of Systems   Constitutional: Negative.  Negative for fatigue and fever.   HENT: Negative.     Eyes: Negative.    Respiratory: Negative.  Negative for cough.    Cardiovascular: Negative.    Gastrointestinal: Negative.    Endocrine: Negative.    Genitourinary: Negative.    Musculoskeletal:  Positive for arthralgias and neck pain.   Skin: Negative.    Allergic/Immunologic: Negative.    Neurological: Negative.    Psychiatric/Behavioral: Negative.        Past Medical History:     Past Medical History:   Diagnosis Date    Bipolar 2 disorder (HCC)     Epilepsy (HCC)       Past Surgical History:     Past Surgical History:   Procedure Laterality Date    WISDOM TOOTH EXTRACTION        Social History:     Social History     Socioeconomic History    Marital status: /Civil Union     Spouse name: None    Number of children: None    Years of education: None    Highest education level: None   Occupational History    None   Tobacco Use    Smoking status: Never    Smokeless tobacco: Never   Vaping Use    Vaping status: Never Used   Substance and Sexual Activity    Alcohol use: Never    Drug use: Never    Sexual activity: None   Other Topics Concern    None   Social History Narrative    None     Social Determinants of Health     Financial Resource Strain: Not on file   Food Insecurity: Not on file   Transportation Needs: Not on file   Physical Activity: Not on file   Stress: Not on file   Social Connections: Not on file   Intimate Partner Violence: Not on file   Housing Stability: Not on file      Family History:     Family History   Problem Relation Age of Onset    Bipolar disorder  "Mother     Seizures Mother     Depression Mother       Current Medications:     Current Outpatient Medications   Medication Sig Dispense Refill    albuterol (Ventolin HFA) 90 mcg/act inhaler Inhale 2 puffs 4 (four) times a day 6.7 g 1    lamoTRIgine  MG TB24 Take 2 tablets (400 mg total) by mouth daily 180 tablet 0    Multiple Vitamins-Minerals (Spectravite) TABS       tiZANidine (ZANAFLEX) 4 mg tablet Take 1 tablet (4 mg total) by mouth every 8 (eight) hours as needed for muscle spasms 10 tablet 0     No current facility-administered medications for this visit.      Allergies:     Allergies   Allergen Reactions    Ibuprofen-Acetaminophen Dizziness, Headache and Vomiting    Monosodium Glutamate - Food Allergy Dizziness, Headache and Vomiting    Penicillins Dizziness, Headache and Vomiting      Physical Exam:     /70   Pulse 102   Temp (!) 97.1 °F (36.2 °C) (Tympanic)   Ht 5' 2\" (1.575 m)   Wt 121 kg (266 lb)   SpO2 98%   BMI 48.65 kg/m²     Physical Exam  Vitals and nursing note reviewed.   Constitutional:       Appearance: She is well-developed.   HENT:      Head: Normocephalic and atraumatic.      Right Ear: External ear normal.      Left Ear: External ear normal.      Nose: Nose normal.   Eyes:      Conjunctiva/sclera: Conjunctivae normal.      Pupils: Pupils are equal, round, and reactive to light.   Cardiovascular:      Rate and Rhythm: Normal rate and regular rhythm.      Heart sounds: Normal heart sounds.   Pulmonary:      Effort: Pulmonary effort is normal.      Breath sounds: Normal breath sounds.   Abdominal:      General: Bowel sounds are normal.      Palpations: Abdomen is soft.   Musculoskeletal:         General: Tenderness present. No signs of injury. Normal range of motion.      Cervical back: Normal range of motion and neck supple.      Comments: Increased tension left trapezius   Skin:     General: Skin is warm and dry.   Neurological:      Mental Status: She is alert and oriented " to person, place, and time.   Psychiatric:         Behavior: Behavior normal.         Thought Content: Thought content normal.         Judgment: Judgment normal.          [facility]

## 2024-02-21 PROBLEM — Z00.00 WELL ADULT EXAM: Status: RESOLVED | Noted: 2021-04-26 | Resolved: 2024-02-21

## 2024-04-30 DIAGNOSIS — G40.909 NONINTRACTABLE EPILEPSY WITHOUT STATUS EPILEPTICUS, UNSPECIFIED EPILEPSY TYPE (HCC): ICD-10-CM

## 2024-05-01 DIAGNOSIS — G40.909 NONINTRACTABLE EPILEPSY WITHOUT STATUS EPILEPTICUS, UNSPECIFIED EPILEPSY TYPE (HCC): ICD-10-CM

## 2024-05-02 RX ORDER — LAMOTRIGINE 200 MG/1
2 TABLET, EXTENDED RELEASE ORAL DAILY
Qty: 180 TABLET | Refills: 0 | OUTPATIENT
Start: 2024-05-02

## 2024-05-02 RX ORDER — LAMOTRIGINE 200 MG/1
2 TABLET, EXTENDED RELEASE ORAL DAILY
Qty: 60 TABLET | Refills: 0 | Status: SHIPPED | OUTPATIENT
Start: 2024-05-02

## 2024-05-31 DIAGNOSIS — G40.909 NONINTRACTABLE EPILEPSY WITHOUT STATUS EPILEPTICUS, UNSPECIFIED EPILEPSY TYPE (HCC): ICD-10-CM

## 2024-05-31 RX ORDER — LAMOTRIGINE 200 MG/1
2 TABLET, EXTENDED RELEASE ORAL DAILY
Qty: 60 TABLET | Refills: 0 | Status: SHIPPED | OUTPATIENT
Start: 2024-05-31

## 2024-05-31 NOTE — TELEPHONE ENCOUNTER
Patient needs updated blood work and has previously placed orders. Please contact patient to go for labs. Courtesy refill provided.  CBC    I sent [employer] message to pt to complete blood work prior to next refill. Please f/u with pt to make sure they complete labs.

## 2024-06-27 DIAGNOSIS — G40.909 NONINTRACTABLE EPILEPSY WITHOUT STATUS EPILEPTICUS, UNSPECIFIED EPILEPSY TYPE (HCC): ICD-10-CM

## 2024-06-28 RX ORDER — LAMOTRIGINE 200 MG/1
2 TABLET, EXTENDED RELEASE ORAL DAILY
Qty: 60 TABLET | Refills: 0 | Status: SHIPPED | OUTPATIENT
Start: 2024-06-28

## 2024-07-27 DIAGNOSIS — G40.909 NONINTRACTABLE EPILEPSY WITHOUT STATUS EPILEPTICUS, UNSPECIFIED EPILEPSY TYPE (HCC): ICD-10-CM

## 2024-07-29 DIAGNOSIS — G40.909 NONINTRACTABLE EPILEPSY WITHOUT STATUS EPILEPTICUS, UNSPECIFIED EPILEPSY TYPE (HCC): ICD-10-CM

## 2024-07-29 RX ORDER — LAMOTRIGINE 200 MG/1
2 TABLET, EXTENDED RELEASE ORAL DAILY
Qty: 60 TABLET | Refills: 0 | Status: SHIPPED | OUTPATIENT
Start: 2024-07-29

## 2024-07-29 NOTE — TELEPHONE ENCOUNTER
Lmom for patient to call the office to schedule her labs.  Only one month of medication was sent to the pharmacy.

## 2024-07-30 RX ORDER — LAMOTRIGINE 200 MG/1
2 TABLET, EXTENDED RELEASE ORAL DAILY
Qty: 60 TABLET | Refills: 0 | OUTPATIENT
Start: 2024-07-30

## 2024-08-28 DIAGNOSIS — G40.909 NONINTRACTABLE EPILEPSY WITHOUT STATUS EPILEPTICUS, UNSPECIFIED EPILEPSY TYPE (HCC): ICD-10-CM

## 2024-08-29 RX ORDER — LAMOTRIGINE 200 MG/1
2 TABLET, EXTENDED RELEASE ORAL DAILY
Qty: 60 TABLET | Refills: 5 | Status: SHIPPED | OUTPATIENT
Start: 2024-08-29

## 2024-09-27 DIAGNOSIS — G40.909 NONINTRACTABLE EPILEPSY WITHOUT STATUS EPILEPTICUS, UNSPECIFIED EPILEPSY TYPE (HCC): ICD-10-CM

## 2024-09-27 RX ORDER — LAMOTRIGINE 200 MG/1
2 TABLET, EXTENDED RELEASE ORAL DAILY
Qty: 180 TABLET | Refills: 1 | Status: SHIPPED | OUTPATIENT
Start: 2024-09-27

## 2025-06-09 DIAGNOSIS — G40.909 NONINTRACTABLE EPILEPSY WITHOUT STATUS EPILEPTICUS, UNSPECIFIED EPILEPSY TYPE (HCC): ICD-10-CM

## 2025-06-09 NOTE — TELEPHONE ENCOUNTER
Left message we received request from a different pharmacy for medication refill and to please call back to verify correct pharmacy.

## 2025-06-09 NOTE — TELEPHONE ENCOUNTER
Medication: lamoTRIgine  MG TB24    Dose/Frequency:  take 2 tablets by mouth once daily    Quantity: 180 tablet (90 day supply)    Pharmacy: [facility] - [place] - [address] [[id number]] Pharmacy changed from [facility] to [facility]     Office:   [x] PCP/Provider -   [] Speciality/Provider -     Does the patient have enough for 3 days?   [x] Yes   [] No - Send as HP to POD

## 2025-06-10 DIAGNOSIS — G40.909 NONINTRACTABLE EPILEPSY WITHOUT STATUS EPILEPTICUS, UNSPECIFIED EPILEPSY TYPE (HCC): Primary | ICD-10-CM

## 2025-06-10 DIAGNOSIS — Z13.220 SCREENING FOR LIPID DISORDERS: ICD-10-CM

## 2025-06-10 DIAGNOSIS — Z13.1 SCREENING FOR DIABETES MELLITUS (DM): ICD-10-CM

## 2025-06-10 DIAGNOSIS — Z13.0 SCREENING FOR DEFICIENCY ANEMIA: ICD-10-CM

## 2025-06-10 DIAGNOSIS — Z13.0 SCREENING FOR ENDOCRINE, METABOLIC AND IMMUNITY DISORDER: ICD-10-CM

## 2025-06-10 DIAGNOSIS — Z13.228 SCREENING FOR ENDOCRINE, METABOLIC AND IMMUNITY DISORDER: ICD-10-CM

## 2025-06-10 DIAGNOSIS — Z13.29 SCREENING FOR ENDOCRINE, METABOLIC AND IMMUNITY DISORDER: ICD-10-CM

## 2025-06-10 RX ORDER — LAMOTRIGINE 200 MG/1
2 TABLET, EXTENDED RELEASE ORAL DAILY
Qty: 180 TABLET | Refills: 1 | OUTPATIENT
Start: 2025-06-10

## 2025-06-24 ENCOUNTER — OFFICE VISIT (OUTPATIENT)
Dept: FAMILY MEDICINE CLINIC | Facility: CLINIC | Age: 35
End: 2025-06-24
Payer: COMMERCIAL

## 2025-06-24 VITALS
HEART RATE: 105 BPM | TEMPERATURE: 99 F | BODY MASS INDEX: 51.34 KG/M2 | SYSTOLIC BLOOD PRESSURE: 134 MMHG | WEIGHT: 279 LBS | HEIGHT: 62 IN | DIASTOLIC BLOOD PRESSURE: 84 MMHG | OXYGEN SATURATION: 96 %

## 2025-06-24 DIAGNOSIS — G40.909 NONINTRACTABLE EPILEPSY WITHOUT STATUS EPILEPTICUS, UNSPECIFIED EPILEPSY TYPE (HCC): ICD-10-CM

## 2025-06-24 DIAGNOSIS — Z00.00 ANNUAL PHYSICAL EXAM: Primary | ICD-10-CM

## 2025-06-24 DIAGNOSIS — Z01.419 WELL WOMAN EXAM: ICD-10-CM

## 2025-06-24 DIAGNOSIS — F31.81 BIPOLAR 2 DISORDER (HCC): ICD-10-CM

## 2025-06-24 PROCEDURE — 99395 PREV VISIT EST AGE 18-39: CPT

## 2025-06-24 NOTE — ASSESSMENT & PLAN NOTE
-will need to see neurology to discuss seizure medication adjustment, will place referral today  Orders:    Ambulatory Referral to Neurology; Future

## 2025-06-24 NOTE — PROGRESS NOTES
Adult Annual Physical  Name: Dwain Bullock      : 1990      MRN: [id number]  Encounter Provider: Kaitlin Frazier DO  Encounter Date: 2025   Encounter department: [facility]    :  Assessment & Plan  Annual physical exam  -cervical ca screening: DUE, will refer to OBGYN  -annual labwork ordered, will print out for patient today       Nonintractable epilepsy without status epilepticus, unspecified epilepsy type (HCC)  -will need to see neurology to discuss seizure medication adjustment, will place referral today  Orders:    Ambulatory Referral to Neurology; Future    Bipolar 2 disorder (HCC)    Orders:    Ambulatory Referral to Neurology; Future    Well woman exam    Orders:    Ambulatory Referral to Obstetrics / Gynecology; Future            Immunizations:  - Immunizations due: Prevnar 20 and Tdap         History of Present Illness     Adult Annual Physical:  Patient presents for annual physical. Patient presents for annual physical. Taking lamotrigene for epilepsy and bipolar 2 and would like to discuss switching medications. Is not currently following with a neurologist. States her last seizure was approximately a year ago..     Diet and Physical Activity:  - Diet/Nutrition: no special diet.  - Exercise: moderate cardiovascular exercise, 3-4 times a week on average and 30-60 minutes on average.    Depression Screening:  - PHQ-2 Score: 0    General Health:  - Sleep: 7-8 hours of sleep on average.  - Hearing: normal hearing bilateral ears.  - Vision: vision problems, most recent eye exam < 1 year ago and wears glasses. Astigmatism right eye  - Dental: no dental visits for > 1 year, does not brush teeth regularly and floss regularly.    /GYN Health:  - Follows with GYN: no.   - History of STDs: no    Advanced Care Planning:  - Has an advanced directive?: no    - Has a durable medical POA?: no      Review of Systems   Constitutional:  Negative for chills and fever.   HENT:  Negative  "for ear pain and sore throat.    Eyes:  Negative for pain and visual disturbance.   Respiratory:  Negative for cough and shortness of breath.    Cardiovascular:  Negative for chest pain and palpitations.   Gastrointestinal:  Negative for abdominal pain and vomiting.   Genitourinary:  Negative for dysuria and hematuria.   Musculoskeletal:  Negative for arthralgias and back pain.   Skin:  Negative for color change and rash.   Neurological:  Negative for seizures and syncope.   All other systems reviewed and are negative.        Objective   /84 (BP Location: Right arm, Patient Position: Sitting, Cuff Size: Large)   Pulse 105   Temp 99 °F (37.2 °C) (Tympanic)   Ht 5' 2\" (1.575 m)   Wt 127 kg (279 lb)   SpO2 96%   BMI 51.03 kg/m²     Physical Exam  Constitutional:       General: She is not in acute distress.     Appearance: Normal appearance. She is not ill-appearing or toxic-appearing.   HENT:      Head: Normocephalic and atraumatic.      Right Ear: Tympanic membrane, ear canal and external ear normal.      Left Ear: Tympanic membrane, ear canal and external ear normal.      Nose: Nose normal.      Mouth/Throat:      Mouth: Mucous membranes are moist.      Pharynx: Oropharynx is clear. No oropharyngeal exudate or posterior oropharyngeal erythema.     Eyes:      Extraocular Movements: Extraocular movements intact.      Conjunctiva/sclera: Conjunctivae normal.      Pupils: Pupils are equal, round, and reactive to light.       Cardiovascular:      Rate and Rhythm: Normal rate and regular rhythm.      Heart sounds: Normal heart sounds. No murmur heard.  Pulmonary:      Effort: Pulmonary effort is normal. No respiratory distress.      Breath sounds: Normal breath sounds. No wheezing, rhonchi or rales.   Abdominal:      General: Bowel sounds are normal. There is no distension.      Palpations: Abdomen is soft.      Tenderness: There is no abdominal tenderness. There is no guarding or rebound.     Musculoskeletal:  "        General: Normal range of motion.      Cervical back: Normal range of motion. No rigidity or tenderness.   Lymphadenopathy:      Cervical: No cervical adenopathy.     Skin:     General: Skin is warm and dry.     Neurological:      General: No focal deficit present.      Mental Status: She is alert and oriented to person, place, and time.     Psychiatric:         Mood and Affect: Mood normal.         Behavior: Behavior normal.

## 2025-06-26 DIAGNOSIS — Z00.6 ENCOUNTER FOR EXAMINATION FOR NORMAL COMPARISON OR CONTROL IN CLINICAL RESEARCH PROGRAM: ICD-10-CM

## 2025-07-07 DIAGNOSIS — G40.909 NONINTRACTABLE EPILEPSY WITHOUT STATUS EPILEPTICUS, UNSPECIFIED EPILEPSY TYPE (HCC): ICD-10-CM

## 2025-07-09 RX ORDER — LAMOTRIGINE 200 MG/1
2 TABLET, EXTENDED RELEASE ORAL DAILY
Qty: 60 TABLET | Refills: 0 | Status: SHIPPED | OUTPATIENT
Start: 2025-07-09

## 2025-07-31 LAB
ALBUMIN SERPL-MCNC: 4 G/DL (ref 3.9–4.9)
ALP SERPL-CCNC: 94 IU/L (ref 44–121)
ALT SERPL-CCNC: 52 IU/L (ref 0–32)
AST SERPL-CCNC: 46 IU/L (ref 0–40)
BASOPHILS # BLD AUTO: 0 X10E3/UL (ref 0–0.2)
BASOPHILS NFR BLD AUTO: 0 %
BILIRUB SERPL-MCNC: 0.4 MG/DL (ref 0–1.2)
BUN SERPL-MCNC: 10 MG/DL (ref 6–20)
BUN/CREAT SERPL: 11 (ref 9–23)
CALCIUM SERPL-MCNC: 9.3 MG/DL (ref 8.7–10.2)
CHLORIDE SERPL-SCNC: 101 MMOL/L (ref 96–106)
CHOLEST SERPL-MCNC: 196 MG/DL (ref 100–199)
CO2 SERPL-SCNC: 18 MMOL/L (ref 20–29)
CREAT SERPL-MCNC: 0.87 MG/DL (ref 0.57–1)
EGFR: 90 ML/MIN/1.73
EOSINOPHIL # BLD AUTO: 0.1 X10E3/UL (ref 0–0.4)
EOSINOPHIL NFR BLD AUTO: 2 %
ERYTHROCYTE [DISTWIDTH] IN BLOOD BY AUTOMATED COUNT: 13.1 % (ref 11.7–15.4)
GLOBULIN SER-MCNC: 3.4 G/DL (ref 1.5–4.5)
GLUCOSE SERPL-MCNC: 99 MG/DL (ref 70–99)
HCT VFR BLD AUTO: 41.7 % (ref 34–46.6)
HDLC SERPL-MCNC: 58 MG/DL
HGB BLD-MCNC: 13.6 G/DL (ref 11.1–15.9)
IMM GRANULOCYTES # BLD: 0 X10E3/UL (ref 0–0.1)
IMM GRANULOCYTES NFR BLD: 0 %
LAMOTRIGINE SERPL-MCNC: 14.3 UG/ML (ref 2–20)
LDLC SERPL CALC-MCNC: 116 MG/DL (ref 0–99)
LDLC/HDLC SERPL: 2 RATIO (ref 0–3.2)
LYMPHOCYTES # BLD AUTO: 3.3 X10E3/UL (ref 0.7–3.1)
LYMPHOCYTES NFR BLD AUTO: 49 %
MCH RBC QN AUTO: 28.9 PG (ref 26.6–33)
MCHC RBC AUTO-ENTMCNC: 32.6 G/DL (ref 31.5–35.7)
MCV RBC AUTO: 89 FL (ref 79–97)
MONOCYTES # BLD AUTO: 0.4 X10E3/UL (ref 0.1–0.9)
MONOCYTES NFR BLD AUTO: 6 %
NEUTROPHILS # BLD AUTO: 2.9 X10E3/UL (ref 1.4–7)
NEUTROPHILS NFR BLD AUTO: 43 %
PLATELET # BLD AUTO: 306 X10E3/UL (ref 150–450)
POTASSIUM SERPL-SCNC: 4.9 MMOL/L (ref 3.5–5.2)
PROT SERPL-MCNC: 7.4 G/DL (ref 6–8.5)
RBC # BLD AUTO: 4.7 X10E6/UL (ref 3.77–5.28)
SL AMB VLDL CHOLESTEROL CALC: 22 MG/DL (ref 5–40)
SODIUM SERPL-SCNC: 136 MMOL/L (ref 134–144)
TRIGL SERPL-MCNC: 126 MG/DL (ref 0–149)
TSH SERPL DL<=0.005 MIU/L-ACNC: 2.68 UIU/ML (ref 0.45–4.5)
WBC # BLD AUTO: 6.8 X10E3/UL (ref 3.4–10.8)

## 2025-08-04 DIAGNOSIS — G40.909 NONINTRACTABLE EPILEPSY WITHOUT STATUS EPILEPTICUS, UNSPECIFIED EPILEPSY TYPE (HCC): ICD-10-CM

## 2025-08-05 RX ORDER — LAMOTRIGINE 200 MG/1
2 TABLET, EXTENDED RELEASE ORAL DAILY
Qty: 60 TABLET | Refills: 3 | Status: SHIPPED | OUTPATIENT
Start: 2025-08-05